# Patient Record
Sex: FEMALE | Race: WHITE | NOT HISPANIC OR LATINO
[De-identification: names, ages, dates, MRNs, and addresses within clinical notes are randomized per-mention and may not be internally consistent; named-entity substitution may affect disease eponyms.]

---

## 2022-10-25 PROBLEM — Z00.00 ENCOUNTER FOR PREVENTIVE HEALTH EXAMINATION: Status: ACTIVE | Noted: 2022-10-25

## 2022-10-26 ENCOUNTER — APPOINTMENT (OUTPATIENT)
Dept: ORTHOPEDIC SURGERY | Facility: CLINIC | Age: 67
End: 2022-10-26

## 2022-10-26 VITALS — BODY MASS INDEX: 29.19 KG/M2 | WEIGHT: 186 LBS | HEIGHT: 67 IN

## 2022-10-26 DIAGNOSIS — M23.91 UNSPECIFIED INTERNAL DERANGEMENT OF RIGHT KNEE: ICD-10-CM

## 2022-10-26 PROCEDURE — 73562 X-RAY EXAM OF KNEE 3: CPT | Mod: 50

## 2022-10-26 PROCEDURE — 99204 OFFICE O/P NEW MOD 45 MIN: CPT

## 2022-10-26 RX ORDER — NAPROXEN 500 MG/1
500 TABLET ORAL
Qty: 60 | Refills: 1 | Status: ACTIVE | COMMUNITY
Start: 2022-10-26 | End: 1900-01-01

## 2022-10-26 NOTE — HISTORY OF PRESENT ILLNESS
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 No formal tx to date.  She has tried biofreeze to temporary benefit. Bracing on the right knee provides some relief. She is able to perform most of her daily activity but with some pain. Difficulty with squatting.  [] : no [FreeTextEntry5] : patient feels pain in  bilateral knees. pt feels popping in bilateral knees. the right knee is worse than the left knee.  [FreeTextEntry9] : Voltaren

## 2022-10-26 NOTE — IMAGING
[de-identified] : RIGHT KNEE\par +Medial joint line tenderness\par  Lateral joint line tenderness \par ROM 0-120\par 5/5 Strength\par NVI\par + [place]rays\par Non-antalgic gait w/o assistance\par  \par LEFT KNEE\par + Mild Medial joint line tenderness\par (-) Lateral joint line tenderness\par ROM 0-125\par 5/5 Strength\par NVI [FreeTextEntry9] : Minimal OA

## 2022-12-09 ENCOUNTER — APPOINTMENT (OUTPATIENT)
Dept: SURGICAL ONCOLOGY | Facility: CLINIC | Age: 67
End: 2022-12-09

## 2022-12-09 VITALS
WEIGHT: 186 LBS | RESPIRATION RATE: 16 BRPM | SYSTOLIC BLOOD PRESSURE: 132 MMHG | DIASTOLIC BLOOD PRESSURE: 75 MMHG | OXYGEN SATURATION: 98 % | HEIGHT: 67 IN | BODY MASS INDEX: 29.19 KG/M2 | HEART RATE: 62 BPM

## 2022-12-09 PROCEDURE — 99205 OFFICE O/P NEW HI 60 MIN: CPT

## 2022-12-10 NOTE — HISTORY OF PRESENT ILLNESS
[de-identified] : Patient is a 67 y/o female who presents an initial consultation for left breast lobular carcinoma (ER/NC+,HER-2 negative). Pt used a fitmob  for her visit.\par \par Biopsy (11/23/22):\par Left breast 2:00, core biopsy:\par -Invasive lobular carcinoma (7 mm in greatest microscopic dimension)\par -ER+/NC+, Her-2 negative\par \par Patient underwent screening mammogram/sonogram on 10/20/22 which showed left breast spiculated mass suspicious for malignancy. No sonographic correlate identified on screening. Also left breast asymmetry. Further unilateral left breast mammogram and ultrasound was performed on 11/9/22 which revealed a left upper outer quadrant, mid third depth 1.1 cm spiculated mass with associated architectural distortion for which a biopsy is recommended. (BI-RADS 5)\par \par No significant past medical history. She is on Atorvastatin for cholesterol. \par Paternal grandmother had breast cancer.

## 2022-12-10 NOTE — PHYSICAL EXAM
[Normal] : supple, no neck mass and thyroid not enlarged [Normal Neck Lymph Nodes] : normal neck lymph nodes  [Normal Supraclavicular Lymph Nodes] : normal supraclavicular lymph nodes [Normal Groin Lymph Nodes] : normal groin lymph nodes [Normal Axillary Lymph Nodes] : normal axillary lymph nodes [Normal] : oriented to person, place and time, with appropriate affect [de-identified] : no masses or adenopathy bilaterally

## 2022-12-10 NOTE — ASSESSMENT
[FreeTextEntry1] : T1 left breast invasive lobular carcinoma, ER/TN+, Her-2 negative\par I had a long discussion with the pt using a [place]  regarding her diagnosis, prognosis and all management options. \par Recommend left breast lumpectomy under Magseed localization and axillary sentinel node biopsy\par Will get pre-op breast MRI to r/o multi-focal or contralateral disease\par Surgical procedure discussed in detail\par All questions answered\par Will need medical clearance \par

## 2022-12-10 NOTE — ADDENDUM
[FreeTextEntry1] : I, Nevin Ricardo, acted solely as a scribe for Dr. Monty Barajas on this date 12/09/2022.\par

## 2022-12-10 NOTE — CONSULT LETTER
[Dear  ___] : Dear  [unfilled], [Consult Letter:] : I had the pleasure of evaluating your patient, [unfilled]. [Please see my note below.] : Please see my note below. [Sincerely,] : Sincerely, [FreeTextEntry3] : Monty Barajas MD FACS\par

## 2022-12-23 ENCOUNTER — RESULT REVIEW (OUTPATIENT)
Age: 67
End: 2022-12-23

## 2022-12-23 ENCOUNTER — OUTPATIENT (OUTPATIENT)
Dept: OUTPATIENT SERVICES | Facility: HOSPITAL | Age: 67
LOS: 1 days | End: 2022-12-23
Payer: MEDICARE

## 2022-12-23 DIAGNOSIS — C80.1 MALIGNANT (PRIMARY) NEOPLASM, UNSPECIFIED: ICD-10-CM

## 2022-12-23 LAB — SURGICAL PATHOLOGY STUDY: SIGNIFICANT CHANGE UP

## 2022-12-23 PROCEDURE — 88321 CONSLTJ&REPRT SLD PREP ELSWR: CPT

## 2022-12-30 ENCOUNTER — OUTPATIENT (OUTPATIENT)
Dept: OUTPATIENT SERVICES | Facility: HOSPITAL | Age: 67
LOS: 1 days | End: 2022-12-30
Payer: MEDICARE

## 2022-12-30 ENCOUNTER — APPOINTMENT (OUTPATIENT)
Dept: MRI IMAGING | Facility: IMAGING CENTER | Age: 67
End: 2022-12-30
Payer: MEDICARE

## 2022-12-30 DIAGNOSIS — C50.912 MALIGNANT NEOPLASM OF UNSPECIFIED SITE OF LEFT FEMALE BREAST: ICD-10-CM

## 2022-12-30 PROCEDURE — 77049 MRI BREAST C-+ W/CAD BI: CPT | Mod: 26

## 2022-12-30 PROCEDURE — C8937: CPT

## 2022-12-30 PROCEDURE — C8908: CPT

## 2022-12-30 PROCEDURE — A9585: CPT

## 2023-01-03 ENCOUNTER — NON-APPOINTMENT (OUTPATIENT)
Age: 68
End: 2023-01-03

## 2023-01-10 ENCOUNTER — OUTPATIENT (OUTPATIENT)
Dept: OUTPATIENT SERVICES | Facility: HOSPITAL | Age: 68
LOS: 1 days | End: 2023-01-10

## 2023-01-10 VITALS
SYSTOLIC BLOOD PRESSURE: 121 MMHG | HEART RATE: 70 BPM | WEIGHT: 195.99 LBS | OXYGEN SATURATION: 98 % | RESPIRATION RATE: 14 BRPM | DIASTOLIC BLOOD PRESSURE: 73 MMHG | HEIGHT: 67 IN | TEMPERATURE: 98 F

## 2023-01-10 DIAGNOSIS — C50.912 MALIGNANT NEOPLASM OF UNSPECIFIED SITE OF LEFT FEMALE BREAST: ICD-10-CM

## 2023-01-10 DIAGNOSIS — Z98.890 OTHER SPECIFIED POSTPROCEDURAL STATES: Chronic | ICD-10-CM

## 2023-01-10 DIAGNOSIS — Z90.710 ACQUIRED ABSENCE OF BOTH CERVIX AND UTERUS: Chronic | ICD-10-CM

## 2023-01-10 LAB
ALBUMIN SERPL ELPH-MCNC: 4.3 G/DL — SIGNIFICANT CHANGE UP (ref 3.3–5)
ALP SERPL-CCNC: 63 U/L — SIGNIFICANT CHANGE UP (ref 40–120)
ALT FLD-CCNC: 21 U/L — SIGNIFICANT CHANGE UP (ref 4–33)
ANION GAP SERPL CALC-SCNC: 9 MMOL/L — SIGNIFICANT CHANGE UP (ref 7–14)
AST SERPL-CCNC: 17 U/L — SIGNIFICANT CHANGE UP (ref 4–32)
BILIRUB SERPL-MCNC: 0.2 MG/DL — SIGNIFICANT CHANGE UP (ref 0.2–1.2)
BUN SERPL-MCNC: 20 MG/DL — SIGNIFICANT CHANGE UP (ref 7–23)
CALCIUM SERPL-MCNC: 9.3 MG/DL — SIGNIFICANT CHANGE UP (ref 8.4–10.5)
CHLORIDE SERPL-SCNC: 103 MMOL/L — SIGNIFICANT CHANGE UP (ref 98–107)
CO2 SERPL-SCNC: 27 MMOL/L — SIGNIFICANT CHANGE UP (ref 22–31)
CREAT SERPL-MCNC: 0.89 MG/DL — SIGNIFICANT CHANGE UP (ref 0.5–1.3)
EGFR: 72 ML/MIN/1.73M2 — SIGNIFICANT CHANGE UP
GLUCOSE SERPL-MCNC: 78 MG/DL — SIGNIFICANT CHANGE UP (ref 70–99)
HCT VFR BLD CALC: 36.6 % — SIGNIFICANT CHANGE UP (ref 34.5–45)
HGB BLD-MCNC: 11.8 G/DL — SIGNIFICANT CHANGE UP (ref 11.5–15.5)
MCHC RBC-ENTMCNC: 27.3 PG — SIGNIFICANT CHANGE UP (ref 27–34)
MCHC RBC-ENTMCNC: 32.2 GM/DL — SIGNIFICANT CHANGE UP (ref 32–36)
MCV RBC AUTO: 84.7 FL — SIGNIFICANT CHANGE UP (ref 80–100)
NRBC # BLD: 0 /100 WBCS — SIGNIFICANT CHANGE UP (ref 0–0)
NRBC # FLD: 0 K/UL — SIGNIFICANT CHANGE UP (ref 0–0)
PLATELET # BLD AUTO: 221 K/UL — SIGNIFICANT CHANGE UP (ref 150–400)
POTASSIUM SERPL-MCNC: 3.9 MMOL/L — SIGNIFICANT CHANGE UP (ref 3.5–5.3)
POTASSIUM SERPL-SCNC: 3.9 MMOL/L — SIGNIFICANT CHANGE UP (ref 3.5–5.3)
PROT SERPL-MCNC: 7.4 G/DL — SIGNIFICANT CHANGE UP (ref 6–8.3)
RBC # BLD: 4.32 M/UL — SIGNIFICANT CHANGE UP (ref 3.8–5.2)
RBC # FLD: 13.7 % — SIGNIFICANT CHANGE UP (ref 10.3–14.5)
SODIUM SERPL-SCNC: 139 MMOL/L — SIGNIFICANT CHANGE UP (ref 135–145)
WBC # BLD: 6.55 K/UL — SIGNIFICANT CHANGE UP (ref 3.8–10.5)
WBC # FLD AUTO: 6.55 K/UL — SIGNIFICANT CHANGE UP (ref 3.8–10.5)

## 2023-01-10 NOTE — H&P PST ADULT - EKG AND INTERPRETATION
Addended by: Bipin Munoz on: 4/26/2022 12:14 PM     Modules accepted: Level of Service ekg in chart from pmd

## 2023-01-10 NOTE — H&P PST ADULT - PROBLEM SELECTOR PLAN 1
Pt scheduled for left breast lumpectomy, magseed localization X1 site, left axillary sentinel node biopsy on 1/23/2023.  labs done results pending, ekg in chart.  pt instructed to obtain preop covid testing.  Hibiclens provided-  written and verbal instructions given with teach back, pt able to verbalize understanding.  Preop teaching done, pt able to verbalize understanding.   medication day of procedure-  pepcid   left upper loose dental implant which holds the bridge-  pt unable to afford dental eval at this time,  pt was evaluated by anesthesia Dr. Aburto while in pst, no dental eval necessary at this time

## 2023-01-10 NOTE — H&P PST ADULT - HISTORY OF PRESENT ILLNESS
68y/o female scheduled fo 66y/o female scheduled for left breast lumpectomy, magseed localization X1 site, left axillary sentinel node biopsy on 1/23/2023.  Pt states, "had abnormal mammogram and US, underwent left breast bx positive for malignancy."

## 2023-01-10 NOTE — H&P PST ADULT - NSICDXPASTMEDICALHX_GEN_ALL_CORE_FT
PAST MEDICAL HISTORY:  Malignant neoplasm of breast      PAST MEDICAL HISTORY:  Eczema     Malignant neoplasm of breast

## 2023-01-10 NOTE — H&P PST ADULT - GASTROINTESTINAL
negative normal/soft/nontender/nondistended/normal active bowel sounds/no guarding/no rigidity/no organomegaly/no palpable brittney/no masses palpable

## 2023-01-10 NOTE — H&P PST ADULT - NEGATIVE CARDIOVASCULAR SYMPTOMS
Recommended weight and BMI control through healthy diet and exercise, green leafy veggies, UV protection, and not smoking. Reviewed the benefits of AREDS VITAMINS VERUS GENOTYPE directed vitamin therapy, and recommended following one or the other to try and prevent the progression of the disease. I advised if patient smokes or has ever smoked to avoid high doses of vitamin A (beta carotene) due to increased risk of lung cancer. Reviewed the importance of daily monitoring of the vision in each eye independently, along with the use of the Amsler grid daily and instructed patient to call and return immediately for any new changes in their vision or on the Amsler grid. Patient instructed on the importance of regular follow up and monitoring for the early detection of conversion to wet AMD as early detection results in early treatment and better outcomes. no chest pain/no palpitations/no dyspnea on exertion/no orthopnea/no paroxysmal nocturnal dyspnea/no peripheral edema/no claudication

## 2023-01-12 ENCOUNTER — OUTPATIENT (OUTPATIENT)
Dept: OUTPATIENT SERVICES | Facility: HOSPITAL | Age: 68
LOS: 1 days | End: 2023-01-12
Payer: MEDICARE

## 2023-01-12 ENCOUNTER — APPOINTMENT (OUTPATIENT)
Dept: MRI IMAGING | Facility: IMAGING CENTER | Age: 68
End: 2023-01-12
Payer: MEDICARE

## 2023-01-12 ENCOUNTER — RESULT REVIEW (OUTPATIENT)
Age: 68
End: 2023-01-12

## 2023-01-12 DIAGNOSIS — Z90.710 ACQUIRED ABSENCE OF BOTH CERVIX AND UTERUS: Chronic | ICD-10-CM

## 2023-01-12 DIAGNOSIS — Z98.890 OTHER SPECIFIED POSTPROCEDURAL STATES: Chronic | ICD-10-CM

## 2023-01-12 DIAGNOSIS — Z00.8 ENCOUNTER FOR OTHER GENERAL EXAMINATION: ICD-10-CM

## 2023-01-12 PROBLEM — L30.9 DERMATITIS, UNSPECIFIED: Chronic | Status: ACTIVE | Noted: 2023-01-10

## 2023-01-12 PROBLEM — C50.919 MALIGNANT NEOPLASM OF UNSPECIFIED SITE OF UNSPECIFIED FEMALE BREAST: Chronic | Status: ACTIVE | Noted: 2023-01-10

## 2023-01-12 PROCEDURE — 77065 DX MAMMO INCL CAD UNI: CPT

## 2023-01-12 PROCEDURE — 19085 BX BREAST 1ST LESION MR IMAG: CPT

## 2023-01-12 PROCEDURE — 77065 DX MAMMO INCL CAD UNI: CPT | Mod: 26,LT

## 2023-01-12 PROCEDURE — 88305 TISSUE EXAM BY PATHOLOGIST: CPT

## 2023-01-12 PROCEDURE — 88305 TISSUE EXAM BY PATHOLOGIST: CPT | Mod: 26

## 2023-01-12 PROCEDURE — 19085 BX BREAST 1ST LESION MR IMAG: CPT | Mod: LT

## 2023-01-12 PROCEDURE — 88360 TUMOR IMMUNOHISTOCHEM/MANUAL: CPT

## 2023-01-12 PROCEDURE — A4648: CPT

## 2023-01-12 PROCEDURE — A9585: CPT

## 2023-01-12 PROCEDURE — 88360 TUMOR IMMUNOHISTOCHEM/MANUAL: CPT | Mod: 26

## 2023-01-18 ENCOUNTER — NON-APPOINTMENT (OUTPATIENT)
Age: 68
End: 2023-01-18

## 2023-01-23 ENCOUNTER — APPOINTMENT (OUTPATIENT)
Dept: SURGICAL ONCOLOGY | Facility: AMBULATORY SURGERY CENTER | Age: 68
End: 2023-01-23

## 2023-01-23 ENCOUNTER — APPOINTMENT (OUTPATIENT)
Dept: NUCLEAR MEDICINE | Facility: IMAGING CENTER | Age: 68
End: 2023-01-23

## 2023-01-27 ENCOUNTER — NON-APPOINTMENT (OUTPATIENT)
Age: 68
End: 2023-01-27

## 2023-02-02 ENCOUNTER — RESULT REVIEW (OUTPATIENT)
Age: 68
End: 2023-02-02

## 2023-02-02 ENCOUNTER — APPOINTMENT (OUTPATIENT)
Dept: MRI IMAGING | Facility: IMAGING CENTER | Age: 68
End: 2023-02-02
Payer: MEDICARE

## 2023-02-02 ENCOUNTER — OUTPATIENT (OUTPATIENT)
Dept: OUTPATIENT SERVICES | Facility: HOSPITAL | Age: 68
LOS: 1 days | End: 2023-02-02
Payer: MEDICARE

## 2023-02-02 DIAGNOSIS — C50.912 MALIGNANT NEOPLASM OF UNSPECIFIED SITE OF LEFT FEMALE BREAST: ICD-10-CM

## 2023-02-02 DIAGNOSIS — Z98.890 OTHER SPECIFIED POSTPROCEDURAL STATES: Chronic | ICD-10-CM

## 2023-02-02 DIAGNOSIS — Z90.710 ACQUIRED ABSENCE OF BOTH CERVIX AND UTERUS: Chronic | ICD-10-CM

## 2023-02-02 PROCEDURE — 88305 TISSUE EXAM BY PATHOLOGIST: CPT | Mod: 26

## 2023-02-02 PROCEDURE — 88305 TISSUE EXAM BY PATHOLOGIST: CPT

## 2023-02-02 PROCEDURE — A9585: CPT

## 2023-02-02 PROCEDURE — 77065 DX MAMMO INCL CAD UNI: CPT | Mod: 26,LT

## 2023-02-02 PROCEDURE — 19085 BX BREAST 1ST LESION MR IMAG: CPT | Mod: LT

## 2023-02-02 PROCEDURE — A4648: CPT

## 2023-02-02 PROCEDURE — 19085 BX BREAST 1ST LESION MR IMAG: CPT

## 2023-02-02 PROCEDURE — 77065 DX MAMMO INCL CAD UNI: CPT

## 2023-02-09 LAB — SURGICAL PATHOLOGY STUDY: SIGNIFICANT CHANGE UP

## 2023-02-13 ENCOUNTER — APPOINTMENT (OUTPATIENT)
Dept: SURGICAL ONCOLOGY | Facility: CLINIC | Age: 68
End: 2023-02-13
Payer: MEDICARE

## 2023-02-13 VITALS
HEIGHT: 67 IN | OXYGEN SATURATION: 97 % | TEMPERATURE: 97.9 F | RESPIRATION RATE: 17 BRPM | HEART RATE: 78 BPM | WEIGHT: 194 LBS | SYSTOLIC BLOOD PRESSURE: 114 MMHG | BODY MASS INDEX: 30.45 KG/M2 | DIASTOLIC BLOOD PRESSURE: 68 MMHG

## 2023-02-13 PROCEDURE — 99215 OFFICE O/P EST HI 40 MIN: CPT

## 2023-02-13 NOTE — HISTORY OF PRESENT ILLNESS
[de-identified] : Patient is a 67 y/o female who presents for pre-op planning visit for left breast cancers and left breast ADH. \par \par MRI guided core biopsy 1/12/23:\par Left inferior central breast: DCIS, solid and cribriform pattern with low grade nuclear atypia. ER/KS+ \par Left upper slightly outer breast: breast tissue with focal ADH \par \par Core Biopsy (11/23/22):\par Left breast 2:00, core biopsy:\par -Invasive lobular carcinoma (7 mm in greatest microscopic dimension)\par -ER+/KS+, Her-2 negative\par \par Patient underwent screening mammogram/sonogram on 10/20/22 which showed left breast spiculated mass suspicious for malignancy. No sonographic correlate identified on screening. Also left breast asymmetry. Further unilateral left breast mammogram and ultrasound was performed on 11/9/22 which revealed a left upper outer quadrant, mid third depth 1.1 cm spiculated mass with associated architectural distortion for which a biopsy is recommended. (BI-RADS 5)\par \par Breast MRI 12/30/2022: Known malignancy left breast. As above, wide excision is recommended. If breast conservation is elected, recommend consideration of biopsy of additional site at inferior central breast with expectant management of focus upper outer quadrant (6 month MRI follow up if benign results obtained). No MRI evidence of malignancy right breast. BIRADS-4A\par \par No significant past medical history. She is on Atorvastatin for cholesterol. \par Paternal grandmother had breast cancer.

## 2023-02-13 NOTE — CONSULT LETTER
[Dear  ___] : Dear  [unfilled], [Please see my note below.] : Please see my note below. [Sincerely,] : Sincerely, [Courtesy Letter:] : I had the pleasure of seeing your patient, [unfilled], in my office today. [FreeTextEntry3] : Monty Barajas MD FACS\par

## 2023-02-13 NOTE — ASSESSMENT
[FreeTextEntry1] : T1 left breast 2:00 invasive lobular carcinoma, ER/MD+, Her-2 negative\par Left inferior central breast DCIS, ER/MD+\par Left upper outer breast ADH\par No evidence of disease seen in right breast on MRI\par I had a long discussion with the pt regarding her diagnosis, prognosis and all management options including mastectomy vs. 3 separate lumpectomies\par I explained again that given the 3 areas of disease, the standard of care would be mastectomy with reconstruction however the patient strongly prefers an attempt at breast conservation\par Imaging reviewed with Dr. Barbara Henao of breast radiology who agrees that 3 site lumpectomy is possible given the focal nature of disease\par Will proceed with 3 site left breast lumpectomy under Magseed localization and left axillary sentinel node biopsy\par Surgical procedure discussed in detail\par All questions answered\par Will need medical clearance \par

## 2023-02-13 NOTE — PHYSICAL EXAM
[Normal] : supple, no neck mass and thyroid not enlarged [Normal Neck Lymph Nodes] : normal neck lymph nodes  [Normal Supraclavicular Lymph Nodes] : normal supraclavicular lymph nodes [Normal Groin Lymph Nodes] : normal groin lymph nodes [Normal Axillary Lymph Nodes] : normal axillary lymph nodes [Normal] : oriented to person, place and time, with appropriate affect [de-identified] : no masses or adenopathy bilaterally

## 2023-02-15 ENCOUNTER — OUTPATIENT (OUTPATIENT)
Dept: OUTPATIENT SERVICES | Facility: HOSPITAL | Age: 68
LOS: 1 days | End: 2023-02-15
Payer: MEDICARE

## 2023-02-15 VITALS
HEIGHT: 66 IN | WEIGHT: 194.01 LBS | RESPIRATION RATE: 18 BRPM | DIASTOLIC BLOOD PRESSURE: 74 MMHG | OXYGEN SATURATION: 98 % | HEART RATE: 66 BPM | TEMPERATURE: 98 F | SYSTOLIC BLOOD PRESSURE: 124 MMHG

## 2023-02-15 DIAGNOSIS — C50.912 MALIGNANT NEOPLASM OF UNSPECIFIED SITE OF LEFT FEMALE BREAST: ICD-10-CM

## 2023-02-15 DIAGNOSIS — Z90.89 ACQUIRED ABSENCE OF OTHER ORGANS: Chronic | ICD-10-CM

## 2023-02-15 DIAGNOSIS — Z90.710 ACQUIRED ABSENCE OF BOTH CERVIX AND UTERUS: Chronic | ICD-10-CM

## 2023-02-15 DIAGNOSIS — Z98.890 OTHER SPECIFIED POSTPROCEDURAL STATES: Chronic | ICD-10-CM

## 2023-02-15 PROCEDURE — 93010 ELECTROCARDIOGRAM REPORT: CPT

## 2023-02-15 RX ORDER — SOD,AMMONIUM,POTASSIUM LACTATE
1 CREAM (GRAM) TOPICAL
Qty: 0 | Refills: 0 | DISCHARGE

## 2023-02-15 NOTE — H&P PST ADULT - RESPIRATORY
clear to auscultation bilaterally/no wheezes/no rales/no rhonchi/airway patent/breath sounds equal/good air movement clear to auscultation bilaterally/no wheezes/no rales/no rhonchi/no respiratory distress/no use of accessory muscles/airway patent/breath sounds equal/good air movement/respirations non-labored

## 2023-02-15 NOTE — H&P PST ADULT - BREASTS DETAILS
normal shape/no tenderness/nipples normal/mass L normal shape/no mass/no tenderness/nipples normal/no discharge or discoloration/normal

## 2023-02-15 NOTE — H&P PST ADULT - PROBLEM SELECTOR PLAN 1
Schedule for left breast lumpectomy Mag Seed localization x3 sites, left axillary sentinel node biopsy on 02/27/23. Pre op instructions, famotidine, chlorhexidine gluconate soap given and explained. Pt verbalized understanding.  [id number] PCR ordered

## 2023-02-15 NOTE — H&P PST ADULT - HISTORY OF PRESENT ILLNESS
68 y/o female     scheduled for left breast lumpectomy, magseed localization X1 site, left axillary sentinel node biopsy on 1/23/2023.  Pt states, "had abnormal mammogram and US, underwent left breast bx positive for malignancy." 66 y/o female presents with pre op diagnosis: malignant neoplasm unspecified site left female breast. Schedule for left breast lumpectomy, Mag seed localization x3 site, left axillary sentinel node biopsy tentatively on 02/27/23.   Pt was initially schedule for same surgery on 01/23/2023. As per pt, surgery was cancelled due to need for further testing.

## 2023-02-15 NOTE — H&P PST ADULT - ASSESSMENT
68 y/o Female presents with pre op diagnosis: malignant neoplasm of unspecified site of left female breast

## 2023-02-23 ENCOUNTER — RESULT REVIEW (OUTPATIENT)
Age: 68
End: 2023-02-23

## 2023-02-23 ENCOUNTER — OUTPATIENT (OUTPATIENT)
Dept: OUTPATIENT SERVICES | Facility: HOSPITAL | Age: 68
LOS: 1 days | End: 2023-02-23
Payer: MEDICARE

## 2023-02-23 ENCOUNTER — APPOINTMENT (OUTPATIENT)
Dept: MAMMOGRAPHY | Facility: IMAGING CENTER | Age: 68
End: 2023-02-23
Payer: MEDICARE

## 2023-02-23 DIAGNOSIS — Z98.890 OTHER SPECIFIED POSTPROCEDURAL STATES: Chronic | ICD-10-CM

## 2023-02-23 DIAGNOSIS — Z90.89 ACQUIRED ABSENCE OF OTHER ORGANS: Chronic | ICD-10-CM

## 2023-02-23 DIAGNOSIS — Z00.8 ENCOUNTER FOR OTHER GENERAL EXAMINATION: ICD-10-CM

## 2023-02-23 DIAGNOSIS — Z90.710 ACQUIRED ABSENCE OF BOTH CERVIX AND UTERUS: Chronic | ICD-10-CM

## 2023-02-23 PROCEDURE — 19282 PERQ DEVICE BREAST EA IMAG: CPT

## 2023-02-23 PROCEDURE — 19281 PERQ DEVICE BREAST 1ST IMAG: CPT | Mod: LT

## 2023-02-23 PROCEDURE — A4648: CPT

## 2023-02-23 PROCEDURE — 19282 PERQ DEVICE BREAST EA IMAG: CPT | Mod: 59,LT

## 2023-02-23 PROCEDURE — 19282 PERQ DEVICE BREAST EA IMAG: CPT | Mod: LT

## 2023-02-23 PROCEDURE — 19281 PERQ DEVICE BREAST 1ST IMAG: CPT

## 2023-02-23 NOTE — H&P PST ADULT - NEGATIVE OPHTHALMOLOGIC SYMPTOMS
Kandice Iv well  Awake alert     Jermaine Ritchie RN  02/23/23 1921 no blurred vision R/no discharge L/no pain L/no pain R/no loss of vision L

## 2023-02-27 ENCOUNTER — APPOINTMENT (OUTPATIENT)
Dept: NUCLEAR MEDICINE | Facility: IMAGING CENTER | Age: 68
End: 2023-02-27

## 2023-02-27 ENCOUNTER — APPOINTMENT (OUTPATIENT)
Dept: MAMMOGRAPHY | Facility: IMAGING CENTER | Age: 68
End: 2023-02-27

## 2023-03-13 ENCOUNTER — RESULT REVIEW (OUTPATIENT)
Age: 68
End: 2023-03-13

## 2023-03-13 ENCOUNTER — APPOINTMENT (OUTPATIENT)
Dept: MAMMOGRAPHY | Facility: IMAGING CENTER | Age: 68
End: 2023-03-13
Payer: MEDICARE

## 2023-03-13 ENCOUNTER — TRANSCRIPTION ENCOUNTER (OUTPATIENT)
Age: 68
End: 2023-03-13

## 2023-03-13 ENCOUNTER — OUTPATIENT (OUTPATIENT)
Dept: OUTPATIENT SERVICES | Facility: HOSPITAL | Age: 68
LOS: 1 days | End: 2023-03-13
Payer: COMMERCIAL

## 2023-03-13 ENCOUNTER — APPOINTMENT (OUTPATIENT)
Dept: SURGICAL ONCOLOGY | Facility: AMBULATORY SURGERY CENTER | Age: 68
End: 2023-03-13

## 2023-03-13 ENCOUNTER — OUTPATIENT (OUTPATIENT)
Dept: OUTPATIENT SERVICES | Facility: HOSPITAL | Age: 68
LOS: 1 days | Discharge: HOME | End: 2023-03-13
Payer: MEDICARE

## 2023-03-13 VITALS
OXYGEN SATURATION: 99 % | HEART RATE: 72 BPM | SYSTOLIC BLOOD PRESSURE: 116 MMHG | TEMPERATURE: 97 F | RESPIRATION RATE: 14 BRPM | DIASTOLIC BLOOD PRESSURE: 68 MMHG

## 2023-03-13 VITALS
WEIGHT: 194.01 LBS | OXYGEN SATURATION: 97 % | TEMPERATURE: 98 F | RESPIRATION RATE: 18 BRPM | HEART RATE: 62 BPM | DIASTOLIC BLOOD PRESSURE: 75 MMHG | SYSTOLIC BLOOD PRESSURE: 128 MMHG | HEIGHT: 66 IN

## 2023-03-13 DIAGNOSIS — Z90.89 ACQUIRED ABSENCE OF OTHER ORGANS: Chronic | ICD-10-CM

## 2023-03-13 DIAGNOSIS — Z90.710 ACQUIRED ABSENCE OF BOTH CERVIX AND UTERUS: Chronic | ICD-10-CM

## 2023-03-13 DIAGNOSIS — Z98.890 OTHER SPECIFIED POSTPROCEDURAL STATES: Chronic | ICD-10-CM

## 2023-03-13 DIAGNOSIS — C50.912 MALIGNANT NEOPLASM OF UNSPECIFIED SITE OF LEFT FEMALE BREAST: ICD-10-CM

## 2023-03-13 DIAGNOSIS — Z00.8 ENCOUNTER FOR OTHER GENERAL EXAMINATION: ICD-10-CM

## 2023-03-13 PROCEDURE — 76098 X-RAY EXAM SURGICAL SPECIMEN: CPT

## 2023-03-13 PROCEDURE — 88342 IMHCHEM/IMCYTCHM 1ST ANTB: CPT | Mod: 26,59

## 2023-03-13 PROCEDURE — 38900 IO MAP OF SENT LYMPH NODE: CPT | Mod: LT

## 2023-03-13 PROCEDURE — 88305 TISSUE EXAM BY PATHOLOGIST: CPT | Mod: 26

## 2023-03-13 PROCEDURE — 88341 IMHCHEM/IMCYTCHM EA ADD ANTB: CPT | Mod: 26,59

## 2023-03-13 PROCEDURE — 14000 TIS TRNFR TRUNK 10 SQ CM/<: CPT

## 2023-03-13 PROCEDURE — 88307 TISSUE EXAM BY PATHOLOGIST: CPT | Mod: 26

## 2023-03-13 PROCEDURE — 88360 TUMOR IMMUNOHISTOCHEM/MANUAL: CPT | Mod: 26

## 2023-03-13 PROCEDURE — 76098 X-RAY EXAM SURGICAL SPECIMEN: CPT | Mod: 26

## 2023-03-13 PROCEDURE — 38525 BIOPSY/REMOVAL LYMPH NODES: CPT | Mod: LT

## 2023-03-13 PROCEDURE — 19301 PARTIAL MASTECTOMY: CPT | Mod: LT

## 2023-03-13 PROCEDURE — 38792 RA TRACER ID OF SENTINL NODE: CPT | Mod: LT,59

## 2023-03-13 DEVICE — ARISTA 3GR: Type: IMPLANTABLE DEVICE | Site: LEFT | Status: FUNCTIONAL

## 2023-03-13 DEVICE — SURGICEL FIBRILLAR 2 X 4": Type: IMPLANTABLE DEVICE | Site: LEFT | Status: FUNCTIONAL

## 2023-03-13 RX ORDER — HALOBETASOL PROPIONATE 0.5 MG/G
1 OINTMENT TOPICAL
Qty: 0 | Refills: 0 | DISCHARGE

## 2023-03-13 RX ORDER — PANTOPRAZOLE SODIUM 20 MG/1
1 TABLET, DELAYED RELEASE ORAL
Qty: 0 | Refills: 0 | DISCHARGE

## 2023-03-13 RX ORDER — HYDROXYZINE HCL 10 MG
1 TABLET ORAL
Qty: 0 | Refills: 0 | DISCHARGE

## 2023-03-13 RX ORDER — OXYCODONE HYDROCHLORIDE 5 MG/1
1 TABLET ORAL
Qty: 20 | Refills: 0
Start: 2023-03-13

## 2023-03-13 NOTE — ASU DISCHARGE PLAN (ADULT/PEDIATRIC) - CARE PROVIDER_API CALL
Monty Barajas)  Surgery  [address] [place] [address]  Phone: [phone]  Fax: [phone]  Follow Up Time: 2 weeks

## 2023-03-13 NOTE — ASU PREOPERATIVE ASSESSMENT, ADULT ([facility] ONLY) - FALL HARM RISK - UNIVERSAL INTERVENTIONS
Bed in lowest position, wheels locked, appropriate side rails in place/Call bell, personal items and telephone in reach/Instruct patient to call for assistance before getting out of bed or chair/Non-slip footwear when patient is out of bed/[place] to call system/Physically safe environment - no spills, clutter or unnecessary equipment/Purposeful Proactive Rounding/Room/bathroom lighting operational, light cord in reach

## 2023-03-13 NOTE — ASU PREOP CHECKLIST - BMI (KG/M2)
Benzoyl Peroxide Pregnancy And Lactation Text: This medication is Pregnancy Category C. It is unknown if benzoyl peroxide is excreted in breast milk. Topical Retinoid Pregnancy And Lactation Text: This medication is Pregnancy Category C. It is unknown if this medication is excreted in breast milk. Tazorac Pregnancy And Lactation Text: This medication is not safe during pregnancy. It is unknown if this medication is excreted in breast milk. Birth Control Pills Counseling: Birth Control Pill Counseling: I discussed with the patient the potential side effects of OCPs including but not limited to increased risk of stroke, heart attack, thrombophlebitis, deep venous thrombosis, hepatic adenomas, breast changes, GI upset, headaches, and depression.  The patient verbalized understanding of the proper use and possible adverse effects of OCPs. All of the patient's questions and concerns were addressed. Dapsone Counseling: I discussed with the patient the risks of dapsone including but not limited to hemolytic anemia, agranulocytosis, rashes, methemoglobinemia, kidney failure, peripheral neuropathy, headaches, GI upset, and liver toxicity.  Patients who start dapsone require monitoring including baseline LFTs and weekly CBCs for the first month, then every month thereafter.  The patient verbalized understanding of the proper use and possible adverse effects of dapsone.  All of the patient's questions and concerns were addressed. Bactrim Counseling:  I discussed with the patient the risks of sulfa antibiotics including but not limited to GI upset, allergic reaction, drug rash, diarrhea, dizziness, photosensitivity, and yeast infections.  Rarely, more serious reactions can occur including but not limited to aplastic anemia, agranulocytosis, methemoglobinemia, blood dyscrasias, liver or kidney failure, lung infiltrates or desquamative/blistering drug rashes. Topical Sulfur Applications Pregnancy And Lactation Text: This medication is Pregnancy Category C and has an unknown safety profile during pregnancy. It is unknown if this topical medication is excreted in breast milk. Winlevi Pregnancy And Lactation Text: This medication is considered safe during pregnancy and breastfeeding. Doxycycline Counseling:  Patient counseled regarding possible photosensitivity and increased risk for sunburn.  Patient instructed to avoid sunlight, if possible.  When exposed to sunlight, patients should wear protective clothing, sunglasses, and sunscreen.  The patient was instructed to call the office immediately if the following severe adverse effects occur:  hearing changes, easy bruising/bleeding, severe headache, or vision changes.  The patient verbalized understanding of the proper use and possible adverse effects of doxycycline.  All of the patient's questions and concerns were addressed. Erythromycin Counseling:  I discussed with the patient the risks of erythromycin including but not limited to GI upset, allergic reaction, drug rash, diarrhea, increase in liver enzymes, and yeast infections. Topical Clindamycin Pregnancy And Lactation Text: This medication is Pregnancy Category B and is considered safe during pregnancy. It is unknown if it is excreted in breast milk. Detail Level: Zone Erythromycin Pregnancy And Lactation Text: This medication is Pregnancy Category B and is considered safe during pregnancy. It is also excreted in breast milk. Isotretinoin Pregnancy And Lactation Text: This medication is Pregnancy Category X and is considered extremely dangerous during pregnancy. It is unknown if it is excreted in breast milk. High Dose Vitamin A Pregnancy And Lactation Text: High dose vitamin A therapy is contraindicated during pregnancy and breast feeding. Tetracycline Pregnancy And Lactation Text: This medication is Pregnancy Category D and not consider safe during pregnancy. It is also excreted in breast milk. Aklief Pregnancy And Lactation Text: It is unknown if this medication is safe to use during pregnancy.  It is unknown if this medication is excreted in breast milk.  Breastfeeding women should use the topical cream on the smallest area of the skin for the shortest time needed while breastfeeding.  Do not apply to nipple and areola. 31.3 Azelaic Acid Pregnancy And Lactation Text: This medication is considered safe during pregnancy and breast feeding. Spironolactone Pregnancy And Lactation Text: This medication can cause feminization of the male fetus and should be avoided during pregnancy. The active metabolite is also found in breast milk. Benzoyl Peroxide Counseling: Patient counseled that medicine may cause skin irritation and bleach clothing.  In the event of skin irritation, the patient was advised to reduce the amount of the drug applied or use it less frequently.   The patient verbalized understanding of the proper use and possible adverse effects of benzoyl peroxide.  All of the patient's questions and concerns were addressed. Topical Retinoid counseling:  Patient advised to apply a pea-sized amount only at bedtime and wait 30 minutes after washing their face before applying.  If too drying, patient may add a non-comedogenic moisturizer. The patient verbalized understanding of the proper use and possible adverse effects of retinoids.  All of the patient's questions and concerns were addressed. Tazorac Counseling:  Patient advised that medication is irritating and drying.  Patient may need to apply sparingly and wash off after an hour before eventually leaving it on overnight.  The patient verbalized understanding of the proper use and possible adverse effects of tazorac.  All of the patient's questions and concerns were addressed. Birth Control Pills Pregnancy And Lactation Text: This medication should be avoided if pregnant and for the first 30 days post-partum. Dapsone Pregnancy And Lactation Text: This medication is Pregnancy Category C and is not considered safe during pregnancy or breast feeding. Include Pregnancy/Lactation Warning?: No Azithromycin Pregnancy And Lactation Text: This medication is considered safe during pregnancy and is also secreted in breast milk. Bactrim Pregnancy And Lactation Text: This medication is Pregnancy Category D and is known to cause fetal risk.  It is also excreted in breast milk. Winlevi Counseling:  I discussed with the patient the risks of topical clascoterone including but not limited to erythema, scaling, itching, and stinging. Patient voiced their understanding. Doxycycline Pregnancy And Lactation Text: This medication is Pregnancy Category D and not consider safe during pregnancy. It is also excreted in breast milk but is considered safe for shorter treatment courses. Azithromycin Counseling:  I discussed with the patient the risks of azithromycin including but not limited to GI upset, allergic reaction, drug rash, diarrhea, and yeast infections. Topical Clindamycin Counseling: Patient counseled that this medication may cause skin irritation or allergic reactions.  In the event of skin irritation, the patient was advised to reduce the amount of the drug applied or use it less frequently.   The patient verbalized understanding of the proper use and possible adverse effects of clindamycin.  All of the patient's questions and concerns were addressed. Topical Sulfur Applications Counseling: Topical Sulfur Counseling: Patient counseled that this medication may cause skin irritation or allergic reactions.  In the event of skin irritation, the patient was advised to reduce the amount of the drug applied or use it less frequently.   The patient verbalized understanding of the proper use and possible adverse effects of topical sulfur application.  All of the patient's questions and concerns were addressed. Isotretinoin Counseling: Patient should get monthly blood tests, not donate blood, not drive at night if vision affected, not share medication, and not undergo elective surgery for 6 months after tx completed. Side effects reviewed, pt to contact office should one occur. High Dose Vitamin A Counseling: Side effects reviewed, pt to contact office should one occur. Aklief counseling:  Patient advised to apply a pea-sized amount only at bedtime and wait 30 minutes after washing their face before applying.  If too drying, patient may add a non-comedogenic moisturizer.  The most commonly reported side effects including irritation, redness, scaling, dryness, stinging, burning, itching, and increased risk of sunburn.  The patient verbalized understanding of the proper use and possible adverse effects of retinoids.  All of the patient's questions and concerns were addressed. Azelaic Acid Counseling: Patient counseled that medicine may cause skin irritation and to avoid applying near the eyes.  In the event of skin irritation, the patient was advised to reduce the amount of the drug applied or use it less frequently.   The patient verbalized understanding of the proper use and possible adverse effects of azelaic acid.  All of the patient's questions and concerns were addressed. Sarecycline Counseling: Patient advised regarding possible photosensitivity and discoloration of the teeth, skin, lips, tongue and gums.  Patient instructed to avoid sunlight, if possible.  When exposed to sunlight, patients should wear protective clothing, sunglasses, and sunscreen.  The patient was instructed to call the office immediately if the following severe adverse effects occur:  hearing changes, easy bruising/bleeding, severe headache, or vision changes.  The patient verbalized understanding of the proper use and possible adverse effects of sarecycline.  All of the patient's questions and concerns were addressed. Spironolactone Counseling: Patient advised regarding risks of diarrhea, abdominal pain, hyperkalemia, birth defects (for female patients), liver toxicity and renal toxicity. The patient may need blood work to monitor liver and kidney function and potassium levels while on therapy. The patient verbalized understanding of the proper use and possible adverse effects of spironolactone.  All of the patient's questions and concerns were addressed. Tetracycline Counseling: Patient counseled regarding possible photosensitivity and increased risk for sunburn.  Patient instructed to avoid sunlight, if possible.  When exposed to sunlight, patients should wear protective clothing, sunglasses, and sunscreen.  The patient was instructed to call the office immediately if the following severe adverse effects occur:  hearing changes, easy bruising/bleeding, severe headache, or vision changes.  The patient verbalized understanding of the proper use and possible adverse effects of tetracycline.  All of the patient's questions and concerns were addressed. Patient understands to avoid pregnancy while on therapy due to potential birth defects. Minocycline Counseling: Patient advised regarding possible photosensitivity and discoloration of the teeth, skin, lips, tongue and gums.  Patient instructed to avoid sunlight, if possible.  When exposed to sunlight, patients should wear protective clothing, sunglasses, and sunscreen.  The patient was instructed to call the office immediately if the following severe adverse effects occur:  hearing changes, easy bruising/bleeding, severe headache, or vision changes.  The patient verbalized understanding of the proper use and possible adverse effects of minocycline.  All of the patient's questions and concerns were addressed. Detail Level: Detailed

## 2023-03-13 NOTE — ASU DISCHARGE PLAN (ADULT/PEDIATRIC) - ASU DC SPECIAL INSTRUCTIONSFT
WOUND CARE: Remove outer plastic dressing if you have one 48hrs from surgery. Covering your incisions are white pieces of tape called steri-strips. You can shower with these and they will curl up and begin to fall off on their own in about 7 days. You might notice a small amount of blood staining or drainage from your abdominal incisions which is normal as well.    BATHING: You may shower daily. Let warm soapy water run over incisions in shower. Do not scrub incisions directly. Pat dry. Do not take tub baths or submerge your incisions in water for 4 weeks to ensure proper healing.    ACTIVITY: No heavy lifting anything more than 10-15lbs (about the weight of a gallon of milk) or straining. Avoid strenuous activity until cleared by your surgeon. If you are taking narcotic pain medication (such as oxycodone), do NOT drive a car, operate machinery or make important decisions.    DIET: Regular    NOTIFY YOUR SURGEON IF: You have any bleeding that does not stop, any pus draining from your wound, any fever (over 100.4 F) or chills, persistent nausea/vomiting with inability to tolerate food or liquids, persistent diarrhea, or if your pain is not controlled on your discharge pain medications.    FOLLOW-UP:  - Please call to make a follow-up appointment within 1-2 weeks of discharge  with Dr Barajas  - Please follow up with your primary care physician in 1-2 weeks regarding your hospitalization

## 2023-03-13 NOTE — ASU PREOP CHECKLIST - LAST TOOK
Reason for Disposition  • Caller has URGENT medication question about med that PCP prescribed and triager unable to answer question    Protocols used: MEDICATION QUESTION CALL-A-AH     clears

## 2023-03-16 LAB — SURGICAL PATHOLOGY STUDY: SIGNIFICANT CHANGE UP

## 2023-03-29 ENCOUNTER — APPOINTMENT (OUTPATIENT)
Dept: SURGICAL ONCOLOGY | Facility: CLINIC | Age: 68
End: 2023-03-29
Payer: MEDICARE

## 2023-03-29 ENCOUNTER — NON-APPOINTMENT (OUTPATIENT)
Age: 68
End: 2023-03-29

## 2023-03-29 VITALS
BODY MASS INDEX: 29.19 KG/M2 | RESPIRATION RATE: 16 BRPM | OXYGEN SATURATION: 98 % | WEIGHT: 186 LBS | DIASTOLIC BLOOD PRESSURE: 76 MMHG | HEIGHT: 67 IN | HEART RATE: 65 BPM | SYSTOLIC BLOOD PRESSURE: 134 MMHG

## 2023-03-29 PROCEDURE — 99024 POSTOP FOLLOW-UP VISIT: CPT

## 2023-03-29 NOTE — ASSESSMENT
[FreeTextEntry1] : T1cN0 left breast invasive lobular carcinoma, ER/UT+, Her-2 negative\par Left inferior central breast DCIS, ER/UT+\par Left upper outer breast ADH- T1aN0 invasive cancer \par S/p left breast lumpectomy and axillary sentinel node biopsy - left ADH upgraded to invasive carcinoma (T1aN0), all margins and lymph nodes negative on final pathology \par Will refer to medical and radiation oncology for adjuvant treatment options \par RTO 3 months\par Will send two Oncotype Dx tests given 2 separate malignancies\par \par \par \par Enclosed pathology report\par \par

## 2023-03-29 NOTE — CONSULT LETTER
[Dear  ___] : Dear  [unfilled], [Courtesy Letter:] : I had the pleasure of seeing your patient, [unfilled], in my office today. [Please see my note below.] : Please see my note below. [Sincerely,] : Sincerely, [FreeTextEntry3] : Monty Barajas MD FACS\par

## 2023-03-29 NOTE — HISTORY OF PRESENT ILLNESS
[de-identified] : Patient is a 68 y/o female who presents an initial post op for left breast cancers and left breast ADH. She is status post 3 site left breast lumpectomies and left axillary sentinel node biopsy on 3/13/23. No complaints postop. \par \par Final pathology (3/13/23):\par 1.  Breast, left lower inner quadrant, lumpectomy:\par -   Ductal carcinoma in situ, high nuclear grade, solid with comedonecrosis\par -   Focal atypical ductal hyperplasia\par -   Biopsy site change\par 2.  Breast, left lower inner quadrant superior margin, margin resection:-Unremarkable breast tissue\par 3.  Breast, left lower inner quadrant medial margin, margin resection : -Unremarkable breast tissue\par 4.  Breast, left lower inner quadrant inferior margin, margin resection:-Unremarkable fibroadipose tissue\par 5.  Breast, left lower inner quadrant lateral margin, margin resection: -Unremarkable breast tissue\par 6.  Breast, left lower inner quadrant deep margin, margin resection: -Unremarkable skeletal muscle\par 7.  Breast, left lower inner quadrant anterior margin, margin resection:-Unremarkable fibroadipose tissue\par 8.  Breast, left upper outer quadrant, lumpectomy- Invasive ductal carcinoma, moderately differentiated, (4.0 mm)\par -   Biopsy site change\par -   Nonproliferative fibrocystic change\par 9.  Lymph nodes, left axillary sentinel, sentinel node biopsies:-No tumor present in 2 lymph nodes (0/2)\par 10.  Breast, left central, lumpectomy:- Invasive lobular carcinoma, classic type (14.0 mm)\par -   Lobular carcinoma in situ\par -   Biopsy site change\par 11.  Breast, left central superior margin, margin resection:-Proliferative fibrocystic change\par 12.  Breast, left central medial margin, margin resection:-Nonproliferative fibrocystic change\par 13.  Breast, left central inferior margin, margin resection:-Nonproliferative fibrocystic change\par 14.  Breast, left central lateral margin, margin resection:-Unremarkable breast tissue\par 15.  Breast, left central deep margin, margin resection:-Nonproliferative and focal proliferative fibrocystic change\par 16.  Breast, left central anterior margin, margin resection: -Nonproliferative fibrocystic change\par \par \par MRI guided core biopsy 1/12/23:\par Left inferior central breast: DCIS, solid and cribriform pattern with low grade nuclear atypia. ER/SC+ \par Left upper slightly outer breast: breast tissue with focal ADH \par \par Core Biopsy (11/23/22):\par Left breast 2:00, core biopsy:\par -Invasive lobular carcinoma (7 mm in greatest microscopic dimension)\par -ER+/SC+, Her-2 negative\par \par Patient underwent screening mammogram/sonogram on 10/20/22 which showed left breast spiculated mass suspicious for malignancy. No sonographic correlate identified on screening. Also left breast asymmetry. Further unilateral left breast mammogram and ultrasound was performed on 11/9/22 which revealed a left upper outer quadrant, mid third depth 1.1 cm spiculated mass with associated architectural distortion for which a biopsy is recommended. (BI-RADS 5)\par \par Breast MRI 12/30/2022: Known malignancy left breast. As above, wide excision is recommended. If breast conservation is elected, recommend consideration of biopsy of additional site at inferior central breast with expectant management of focus upper outer quadrant (6 month MRI follow up if benign results obtained). No MRI evidence of malignancy right breast. BIRADS-4A\par \par No significant past medical history. She is on Atorvastatin for cholesterol. \par Paternal grandmother had breast cancer.

## 2023-03-29 NOTE — ADDENDUM
[FreeTextEntry1] : I, Nevin Ricardo, acted solely as a scribe for Dr. Monty Barajas on this date 03/29/2023.\par

## 2023-03-29 NOTE — PHYSICAL EXAM
[Normal] : supple, no neck mass and thyroid not enlarged [Normal Neck Lymph Nodes] : normal neck lymph nodes  [Normal Supraclavicular Lymph Nodes] : normal supraclavicular lymph nodes [Normal Groin Lymph Nodes] : normal groin lymph nodes [Normal Axillary Lymph Nodes] : normal axillary lymph nodes [Normal] : oriented to person, place and time, with appropriate affect [de-identified] : left breast incisions healing well.  no evidence of infection.

## 2023-04-17 ENCOUNTER — APPOINTMENT (OUTPATIENT)
Dept: SURGICAL ONCOLOGY | Facility: CLINIC | Age: 68
End: 2023-04-17
Payer: MEDICARE

## 2023-04-17 VITALS
OXYGEN SATURATION: 98 % | HEART RATE: 69 BPM | BODY MASS INDEX: 30.61 KG/M2 | SYSTOLIC BLOOD PRESSURE: 116 MMHG | HEIGHT: 67 IN | WEIGHT: 195 LBS | RESPIRATION RATE: 17 BRPM | DIASTOLIC BLOOD PRESSURE: 72 MMHG | TEMPERATURE: 97.6 F

## 2023-04-17 PROCEDURE — 99024 POSTOP FOLLOW-UP VISIT: CPT

## 2023-04-17 NOTE — ASSESSMENT
[FreeTextEntry1] : T1cN0 left breast invasive lobular carcinoma, ER/IA+, Her-2 negative\par Left inferior central breast DCIS, ER/IA+\par Left upper outer breast ADH- T1aN0 invasive cancer \par S/p left breast lumpectomy and axillary sentinel node biopsy - left ADH upgraded to invasive carcinoma (T1aN0), all margins and lymph nodes negative on final pathology \par \par PLAN:\par Will refer to medical and radiation oncology for adjuvant treatment options \par RTO 3 months\par Will send two Oncotype Dx tests given 2 separate malignancies\par \par

## 2023-04-17 NOTE — PHYSICAL EXAM
[Normal] : supple, no neck mass and thyroid not enlarged [Normal Neck Lymph Nodes] : normal neck lymph nodes  [Normal Supraclavicular Lymph Nodes] : normal supraclavicular lymph nodes [Normal Groin Lymph Nodes] : normal groin lymph nodes [Normal Axillary Lymph Nodes] : normal axillary lymph nodes [Normal] : oriented to person, place and time, with appropriate affect [de-identified] : left breast incisions healing well.  no evidence of infection.

## 2023-04-17 NOTE — HISTORY OF PRESENT ILLNESS
[de-identified] : Patient is a 68 y/o female who presents an initial post op for left breast cancers and left breast ADH. She is status post 3 site left breast lumpectomies and left axillary sentinel node biopsy on 3/13/23. No complaints postop. \par \par Final pathology (3/13/23):\par 1.  Breast, left lower inner quadrant, lumpectomy:\par -   Ductal carcinoma in situ, high nuclear grade, solid with comedonecrosis\par -   Focal atypical ductal hyperplasia\par -   Biopsy site change\par 2.  Breast, left lower inner quadrant superior margin, margin resection:-Unremarkable breast tissue\par 3.  Breast, left lower inner quadrant medial margin, margin resection : -Unremarkable breast tissue\par 4.  Breast, left lower inner quadrant inferior margin, margin resection:-Unremarkable fibroadipose tissue\par 5.  Breast, left lower inner quadrant lateral margin, margin resection: -Unremarkable breast tissue\par 6.  Breast, left lower inner quadrant deep margin, margin resection: -Unremarkable skeletal muscle\par 7.  Breast, left lower inner quadrant anterior margin, margin resection:-Unremarkable fibroadipose tissue\par 8.  Breast, left upper outer quadrant, lumpectomy- Invasive ductal carcinoma, moderately differentiated, (4.0 mm)\par -   Biopsy site change\par -   Nonproliferative fibrocystic change\par 9.  Lymph nodes, left axillary sentinel, sentinel node biopsies:-No tumor present in 2 lymph nodes (0/2)\par 10.  Breast, left central, lumpectomy:- Invasive lobular carcinoma, classic type (14.0 mm)\par -   Lobular carcinoma in situ\par -   Biopsy site change\par 11.  Breast, left central superior margin, margin resection:-Proliferative fibrocystic change\par 12.  Breast, left central medial margin, margin resection:-Nonproliferative fibrocystic change\par 13.  Breast, left central inferior margin, margin resection:-Nonproliferative fibrocystic change\par 14.  Breast, left central lateral margin, margin resection:-Unremarkable breast tissue\par 15.  Breast, left central deep margin, margin resection:-Nonproliferative and focal proliferative fibrocystic change\par 16.  Breast, left central anterior margin, margin resection: -Nonproliferative fibrocystic change\par \par \par MRI guided core biopsy 1/12/23:\par Left inferior central breast: DCIS, solid and cribriform pattern with low grade nuclear atypia. ER/TN+ \par Left upper slightly outer breast: breast tissue with focal ADH \par \par Core Biopsy (11/23/22):\par Left breast 2:00, core biopsy:\par -Invasive lobular carcinoma (7 mm in greatest microscopic dimension)\par -ER+/TN+, Her-2 negative\par \par Patient underwent screening mammogram/sonogram on 10/20/22 which showed left breast spiculated mass suspicious for malignancy. No sonographic correlate identified on screening. Also left breast asymmetry. Further unilateral left breast mammogram and ultrasound was performed on 11/9/22 which revealed a left upper outer quadrant, mid third depth 1.1 cm spiculated mass with associated architectural distortion for which a biopsy is recommended. (BI-RADS 5)\par \par Breast MRI 12/30/2022: Known malignancy left breast. As above, wide excision is recommended. If breast conservation is elected, recommend consideration of biopsy of additional site at inferior central breast with expectant management of focus upper outer quadrant (6 month MRI follow up if benign results obtained). No MRI evidence of malignancy right breast. BIRADS-4A\par \par No significant past medical history. She is on Atorvastatin for cholesterol. \par Paternal grandmother had breast cancer.

## 2023-04-24 ENCOUNTER — APPOINTMENT (OUTPATIENT)
Dept: SURGICAL ONCOLOGY | Facility: CLINIC | Age: 68
End: 2023-04-24
Payer: MEDICARE

## 2023-04-24 VITALS
WEIGHT: 195 LBS | RESPIRATION RATE: 16 BRPM | BODY MASS INDEX: 30.61 KG/M2 | OXYGEN SATURATION: 97 % | DIASTOLIC BLOOD PRESSURE: 73 MMHG | HEIGHT: 67 IN | HEART RATE: 60 BPM | SYSTOLIC BLOOD PRESSURE: 113 MMHG

## 2023-04-24 PROCEDURE — 99213 OFFICE O/P EST LOW 20 MIN: CPT | Mod: 24

## 2023-05-03 ENCOUNTER — OUTPATIENT (OUTPATIENT)
Dept: OUTPATIENT SERVICES | Facility: HOSPITAL | Age: 68
LOS: 1 days | Discharge: HOME | End: 2023-05-03

## 2023-05-03 DIAGNOSIS — Z90.89 ACQUIRED ABSENCE OF OTHER ORGANS: Chronic | ICD-10-CM

## 2023-05-03 DIAGNOSIS — D05.10 INTRADUCTAL CARCINOMA IN SITU OF UNSPECIFIED BREAST: ICD-10-CM

## 2023-05-03 DIAGNOSIS — Z98.890 OTHER SPECIFIED POSTPROCEDURAL STATES: Chronic | ICD-10-CM

## 2023-05-03 DIAGNOSIS — Z90.710 ACQUIRED ABSENCE OF BOTH CERVIX AND UTERUS: Chronic | ICD-10-CM

## 2023-05-10 RX ORDER — SULFAMETHOXAZOLE AND TRIMETHOPRIM 800; 160 MG/1; MG/1
800-160 TABLET ORAL TWICE DAILY
Qty: 20 | Refills: 0 | Status: DISCONTINUED | COMMUNITY
Start: 2023-04-17 | End: 2023-05-10

## 2023-05-12 ENCOUNTER — APPOINTMENT (OUTPATIENT)
Dept: RADIATION ONCOLOGY | Facility: CLINIC | Age: 68
End: 2023-05-12
Payer: MEDICARE

## 2023-05-12 ENCOUNTER — NON-APPOINTMENT (OUTPATIENT)
Age: 68
End: 2023-05-12

## 2023-05-12 ENCOUNTER — OUTPATIENT (OUTPATIENT)
Dept: OUTPATIENT SERVICES | Facility: HOSPITAL | Age: 68
LOS: 1 days | Discharge: HOME | End: 2023-05-12
Payer: MEDICARE

## 2023-05-12 ENCOUNTER — APPOINTMENT (OUTPATIENT)
Dept: HEMATOLOGY ONCOLOGY | Facility: CLINIC | Age: 68
End: 2023-05-12
Payer: MEDICARE

## 2023-05-12 VITALS
HEART RATE: 68 BPM | HEIGHT: 67 IN | RESPIRATION RATE: 17 BRPM | OXYGEN SATURATION: 97 % | SYSTOLIC BLOOD PRESSURE: 125 MMHG | TEMPERATURE: 97.16 F | WEIGHT: 196.98 LBS | BODY MASS INDEX: 30.92 KG/M2 | DIASTOLIC BLOOD PRESSURE: 76 MMHG

## 2023-05-12 VITALS
RESPIRATION RATE: 16 BRPM | TEMPERATURE: 97 F | DIASTOLIC BLOOD PRESSURE: 70 MMHG | SYSTOLIC BLOOD PRESSURE: 115 MMHG | HEART RATE: 61 BPM | OXYGEN SATURATION: 97 %

## 2023-05-12 DIAGNOSIS — Z90.710 ACQUIRED ABSENCE OF BOTH CERVIX AND UTERUS: Chronic | ICD-10-CM

## 2023-05-12 DIAGNOSIS — Z98.890 OTHER SPECIFIED POSTPROCEDURAL STATES: Chronic | ICD-10-CM

## 2023-05-12 DIAGNOSIS — Z78.9 OTHER SPECIFIED HEALTH STATUS: ICD-10-CM

## 2023-05-12 DIAGNOSIS — Z87.19 PERSONAL HISTORY OF OTHER DISEASES OF THE DIGESTIVE SYSTEM: ICD-10-CM

## 2023-05-12 DIAGNOSIS — Z87.891 PERSONAL HISTORY OF NICOTINE DEPENDENCE: ICD-10-CM

## 2023-05-12 DIAGNOSIS — Z92.29 PERSONAL HISTORY OF OTHER DRUG THERAPY: ICD-10-CM

## 2023-05-12 DIAGNOSIS — Z90.89 ACQUIRED ABSENCE OF OTHER ORGANS: Chronic | ICD-10-CM

## 2023-05-12 DIAGNOSIS — Z80.9 FAMILY HISTORY OF MALIGNANT NEOPLASM, UNSPECIFIED: ICD-10-CM

## 2023-05-12 PROCEDURE — 77262 THER RADIOLOGY TX PLNG INTRM: CPT

## 2023-05-12 PROCEDURE — 99205 OFFICE O/P NEW HI 60 MIN: CPT

## 2023-05-12 PROCEDURE — 99204 OFFICE O/P NEW MOD 45 MIN: CPT | Mod: 25

## 2023-05-12 RX ORDER — PANTOPRAZOLE 40 MG/1
40 TABLET, DELAYED RELEASE ORAL
Refills: 0 | Status: ACTIVE | COMMUNITY

## 2023-05-12 RX ORDER — CHOLECALCIFEROL (VITAMIN D3) 25 MCG
TABLET ORAL
Refills: 0 | Status: ACTIVE | COMMUNITY

## 2023-05-12 RX ORDER — PSYLLIUM HUSK 0.4 G
CAPSULE ORAL
Refills: 0 | Status: ACTIVE | COMMUNITY

## 2023-05-12 RX ORDER — LORATADINE 10 MG/1
10 TABLET ORAL
Refills: 0 | Status: ACTIVE | COMMUNITY

## 2023-05-12 NOTE — REVIEW OF SYSTEMS
[Patient Intake Form Reviewed] : Patient intake form was reviewed [Constipation] : constipation [Negative] : Heme/Lymph [de-identified] : healed left breast/axilla scars

## 2023-05-12 NOTE — HISTORY OF PRESENT ILLNESS
[FreeTextEntry1] : Ms. Montejo is a 67 year old English/Estonian speaking female who presents in consultation for consideration of radiation therapy.\par \par Diagnosis:  pT1c pN0 LEFT Breast,  Invasive Ductal Carcinoma (4.0 mm), Invasive Lobular Carcinoma (14.0 mm) and DCIS, high nuclear grade.  All margins negative.  Two lymph nodes with no tumor present (0/2).  ER + (99%) IN + (99%) HER2 - \par Oncotype DX Recurrence Score:  6  (Lobular) \par \par HPI : \par \par 10/20/22 - Bilateral Screening Mammogram/US:  Left breast spiculated mass suspicious for malignancy.  No sonographic correlate identified on screening.  Left breast asymmetry.  No suspicious right breast mammographic findings.  No suspicious sonographic findings seen in either breast.  Additional imaging recommended.   BIRADS 0\par \par 11/9/22 -  Unilateral LEFT Diagnostic Callback Mammogram/US:  Left upper outer quadrant suspicious mass, tomosynthesis guided biopsy recommended.  BIRADS 5 \par \par 11/23/22 - underwent LEFT Breast, 2:00 Biopsy:  Pathology - [facility] review  Invasive moderately differentiated Lobular Carcinoma (9 mm).  ER + (>95%) IN + (>95%) HER2 - \par \par 12/30/22 - Breast MRI:  IMPRESSION:    Known malignancy left breast. As detailed in full report, wide excision is recommended. If breast conservation therapy is elected recommend consideration of biopsy of additional site eg that in the inferior central breast with expectant management of focus upper outer quadrant (6 month MRI follow-up if benign results are obtained)\par No MRI evidence of malignancy right breast.  BIRADS 4A   *** ADDENDUM # 1 ***   Note: Patient is status post MRI guided biopsy 1/12/2023 of the left inferior breast revealing DCIS. Known invasive lobular carcinoma left breast.  If will change surgical management recommend consideration of biopsy of additional prominent focus noted upper slightly outer left breast.\par \par 1/12/23 - underwent LEFT Breast, Inferior central MRI guided Biopsy:  Pathology -  DCIS, solid and cribriform pattern with ow grade nuclear atypia.  Uninvolved breast tissue shows non-proliferative fibrocystic change with apocrine metaplasia.  Microcalcifications not identified.  ER + (95%) IN + (95%)  \par \par 2/2/23 - underwent LEFT Breast, Upper slightly outer MRI guided Biopsy:  Pathology - Breast tissue with focal atypical duct hyperplasia (multiple additional deeper levels were examined).  Dense stromal fibrosis.  Fibrocystic changes with calcifications.  \par \par 3/13/23 - underwent 3 site LEFT Breast Lumpectomies and LEFT Axillary [place] Node Biopsy (Dr. Barajas - surgeon):  Pathology -\par - # 1. LEFT Breast, Lower Inner Quadrant Lumpectomy:  DCIS, high nuclear grade, solid with comedonecrosis.  Focal atypical ductal hyperplasia.  Biopsy site change.  \par - # 2. LEFT Breast, Upper outer Quadrant Lumpectomy:  Invasive Ductal Carcinoma (4.0 mm), moderately differentiated.  Biopsy site change. Nonproliferative fibrocystic change.  \par - # 3. LEFT Breast, Central Lumpectomy:  Invasive Lobular Carcinoma (14.0 mm), classic type. LCIS.  Biopsy site change.\par Oncotype DX Recurrence Score:  6 \par -  LEFT Axillary [place] Node Biopsy:  No tumor present in 2 Lymph nodes (0/2). \par \par 4/17/23 - saw Dr. Barajas (surgeon) in follow up ....  healing well.  Two Oncotype DX tests requested given two separate malignancies.  Referrals to medical and radiation oncology.  \par \par 5/12/23 - presents in consultation for discussion of radiation therapy options.  Ms. Montejo is feeling well today and has healed from her surgery.  Denies any pain.  She looks forward to next steps in her treatment.  She is to also see Dr. Bartlett ([facility]) today in consultation.  \par

## 2023-05-12 NOTE — OB/GYN HISTORY
[Currently In Menopause] : currently in menopause [___] : Total Pregnancies: [unfilled] [History of Hormone Replacement Therapy] : no history of hormone replacement therapy [Experiencing Menopausal Sxs] : not experiencing menopausal symptoms

## 2023-05-12 NOTE — REASON FOR VISIT
[Consideration of Curative Therapy] : consideration of curative therapy for [Breast Cancer] : breast cancer [Friend] : friend [[employer] ] : provided by [employer]   [Interpreters_IDNumber] : [id number] [FreeTextEntry3] : Nichole  [TWNoteComboBox1] : Other

## 2023-05-12 NOTE — VITALS
[Maximal Pain Intensity: 0/10] : 0/10 [Least Pain Intensity: 0/10] : 0/10 [NoTreatment Scheduled] : no treatment scheduled [ECOG Performance Status: 0 - Fully active, able to carry on all pre-disease performance without restriction] : Performance Status: 0 - Fully active, able to carry on all pre-disease performance without restriction [90: Able to carry normal activity; minor signs or symptoms of disease.] : 90: Able to carry normal activity; minor signs or symptoms of disease.  [Date: ____________] : Patient's last distress assessment performed on [unfilled]. [1 - Distress Level] : Distress Level: 1 [Patient given social work contact information and resource sheet] : Patient was given social work contact information and resource sheet

## 2023-05-12 NOTE — PHYSICAL EXAM
[Sclera] : the sclera and conjunctiva were normal [Outer Ear] : the ears and nose were normal in appearance [No UE Edema] : there is no upper extremity edema [Normal] : oriented to person, place and time, the affect was normal, the mood was normal and not anxious [de-identified] : left breast and axillary scars healed well

## 2023-05-14 NOTE — HISTORY OF PRESENT ILLNESS
[Disease: _____________________] : Disease: [unfilled] [T: ___] : T[unfilled] [N: ___] : N[unfilled] [M: ___] : M[unfilled] [100: Normal, no complaints, no evidence of disease.] : 100: Normal, no complaints, no evidence of disease. [ECOG Performance Status: 0 - Fully active, able to carry on all pre-disease performance without restriction] : Performance Status: 0 - Fully active, able to carry on all pre-disease performance without restriction [de-identified] : Patient initially had routine screening bilateral screening mammogram on 10/20/2022 which showed in the left breast a spiculated mass suspicious for malignancy. No sonographic correlate identified on screening. Left breast asymmetry was also noted. Diagnostic mammo-sono on 22 showed suspicious mass in the left upper outer quadrant of the left breast (BIRADS5).\par \par 22 - Left breast biopsy 2:00 0 invasive moderately differentiated lobular carcinoma (9mm ER 95%, NC 95%, Her2 negative).\par \par Pre-operative MRI 22 Known malignancy left breast.  If breast conservation therapy is elected recommend consideration of biopsy of additional site eg that in the inferior central breast with expectant management of focus upper outer quadrant (6 month MRI follow-up if benign results are obtained)\par No MRI evidence of malignancy right breast. BIRADS 4A\par \par MRI guided biopsy 2023 of the left inferior breast revealing DCIS ER-95%, NC-95%. Known invasive lobular carcinoma left breast. If will change surgical management recommend consideration of biopsy of additional prominent focus noted upper slightly outer left breast.\par \par 23 - underwent LEFT Breast, Upper slightly outer MRI guided Biopsy: Pathology - Breast tissue with focal atypical duct hyperplasia (multiple additional deeper levels were examined). Dense stromal fibrosis. Fibrocystic changes with calcifications. \par \par Surgery 3/13/23 - underwent 3 site LEFT Breast Lumpectomies and LEFT Axillary [place] Node Biopsy (Dr. Barajas - surgeon) \par Pathology :\par - # 1. LEFT Breast, Lower Inner Quadrant Lumpectomy: DCIS, high nuclear grade, solid with comedonecrosis. Focal atypical ductal hyperplasia. Biopsy site change. \par - # 2. LEFT Breast, Upper outer Quadrant Lumpectomy: Invasive Ductal Carcinoma (4.0 mm), moderately differentiated. Biopsy site change. Nonproliferative fibrocystic change. \par - # 3. LEFT Breast, Central Lumpectomy: Invasive Lobular Carcinoma (14.0 mm), classic type. LCIS. Biopsy site change.\par \par Oncotype DX Recurrence Score: 6 \par - LEFT Axillary [place] Node Biopsy: No tumor present in 2 Lymph nodes (0/2). \par \par Risk assessment : menarche at 14, , 1st pregnancy at age 31, BRIE in  but unclear if ovaries removed; no OCPs, no HRT; offered genetic testing but she declined\par \par Seen by Radiation Oncology (Dr. Hill today) Plan for Radiation 16 fractions\par  [de-identified] : ER 99%, PR99%, Her 2 negative (0 by IHC) [de-identified] : Oncotype RS 6

## 2023-05-14 NOTE — PHYSICAL EXAM
[Normal] : affect appropriate [de-identified] : well healed left breast roosevelt-areolar incision; well healed left axillary incision site; no palpable lesions in either breast. no skin dimpling or nipple retraction

## 2023-05-14 NOTE — CONSULT LETTER
[Dear  ___] : Dear  [unfilled], [Consult Letter:] : I had the pleasure of evaluating your patient, [unfilled]. [Please see my note below.] : Please see my note below. [Consult Closing:] : Thank you very much for allowing me to participate in the care of this patient.  If you have any questions, please do not hesitate to contact me. [Sincerely,] : Sincerely, [FreeTextEntry3] : Dayday Bartlett MD\par \par Division of Hematology/Oncology\par [facility]\par [address]\[place] [address] \par Tel: [phone]\par Fax: [phone]\par Email: [email or web address]  [DrTeresa  ___] : Dr. MUSTAFA

## 2023-05-14 NOTE — ASSESSMENT
[FreeTextEntry1] : 66 yo woman with multifocal left breast cancer Stage 1A ([id number]), ER+MN+ her2 negative with oncotype 6 s/p multiple site lumpectomies.\par \par - no role for chemotherapy given <1% absolute benefit from chemotherapy\par - will proceed with radiation therapy as recommended by Dr. Kristen Hill\par - upon completion of radiation therapy, will initiate endocrine therapy with aromatase inhibitor. With patient who has Oncotype of 6, with use of endocrine therapy, based on results of TAILORx trail, distant recurrence risk will be reduced by 3%. \par - would start with anastrazole 1 mg daily upon completion of radiation therapy; will plan for 7-10 years of therapy\par - risks/benefits/side effects including but not limited to increased risk of osteoporosis, worsening arthralgia/myalgia, rise in LFTs that will need to be monitored every 6 months and worsening lipid profile that will need to be monitored by PMD \par - to continue mammo/sono annually as per Dr. Barajas\par - patient has never had bone density; script provided to complete Bone density today\par - Patient had the opportunity to have all their questions answered to their satisfaction \par - f/u in 3 months (i.e. about 1 month after starting anastrazole)

## 2023-05-18 PROCEDURE — 77290 THER RAD SIMULAJ FIELD CPLX: CPT | Mod: 26

## 2023-05-18 PROCEDURE — 77333 RADIATION TREATMENT AID(S): CPT | Mod: 26

## 2023-05-25 PROCEDURE — 77295 3-D RADIOTHERAPY PLAN: CPT | Mod: 26

## 2023-05-25 PROCEDURE — 77334 RADIATION TREATMENT AID(S): CPT | Mod: 26

## 2023-05-25 PROCEDURE — 77300 RADIATION THERAPY DOSE PLAN: CPT | Mod: 26

## 2023-06-01 PROCEDURE — 77280 THER RAD SIMULAJ FIELD SMPL: CPT | Mod: 26

## 2023-06-02 PROCEDURE — 77427 RADIATION TX MANAGEMENT X5: CPT

## 2023-06-05 ENCOUNTER — NON-APPOINTMENT (OUTPATIENT)
Age: 68
End: 2023-06-05

## 2023-06-05 NOTE — REASON FOR VISIT
[Routine On-Treatment] : a routine on-treatment visit for [Breast Cancer] : breast cancer [FreeTextEntry3] : Nichole  [TWNoteComboBox1] : Other

## 2023-06-05 NOTE — HISTORY OF PRESENT ILLNESS
[FreeTextEntry1] : Ms. Montejo is a 67 year old English/Vietnamese speaking female who presents for an on treatment visit. \par \par Diagnosis:  pT1c pN0 LEFT Breast,  Invasive Ductal Carcinoma (4.0 mm), Invasive Lobular Carcinoma (14.0 mm) and DCIS, high nuclear grade.  All margins negative.  Two lymph nodes with no tumor present (0/2).  ER + (99%) IL + (99%) HER2 - \par Oncotype DX Recurrence Score:  6  (Lobular) \par Oncotype DX Recurrence Score:  15 (Ductal) \par \par HPI : \par \par 10/20/22 - Bilateral Screening Mammogram/US:  Left breast spiculated mass suspicious for malignancy.  No sonographic correlate identified on screening.  Left breast asymmetry.  No suspicious right breast mammographic findings.  No suspicious sonographic findings seen in either breast.  Additional imaging recommended.   BIRADS 0\par \par 11/9/22 -  Unilateral LEFT Diagnostic Callback Mammogram/US:  Left upper outer quadrant suspicious mass, tomosynthesis guided biopsy recommended.  BIRADS 5 \par \par 11/23/22 - underwent LEFT Breast, 2:00 Biopsy:  Pathology - [facility] review  Invasive moderately differentiated Lobular Carcinoma (9 mm).  ER + (>95%) IL + (>95%) HER2 - \par \par 12/30/22 - Breast MRI:  IMPRESSION:    Known malignancy left breast. As detailed in full report, wide excision is recommended. If breast conservation therapy is elected recommend consideration of biopsy of additional site eg that in the inferior central breast with expectant management of focus upper outer quadrant (6 month MRI follow-up if benign results are obtained)\par No MRI evidence of malignancy right breast.  BIRADS 4A   *** ADDENDUM # 1 ***   Note: Patient is status post MRI guided biopsy 1/12/2023 of the left inferior breast revealing DCIS. Known invasive lobular carcinoma left breast.  If will change surgical management recommend consideration of biopsy of additional prominent focus noted upper slightly outer left breast.\par \par 1/12/23 - underwent LEFT Breast, Inferior central MRI guided Biopsy:  Pathology -  DCIS, solid and cribriform pattern with ow grade nuclear atypia.  Uninvolved breast tissue shows non-proliferative fibrocystic change with apocrine metaplasia.  Microcalcifications not identified.  ER + (95%) IL + (95%)  \par \par 2/2/23 - underwent LEFT Breast, Upper slightly outer MRI guided Biopsy:  Pathology - Breast tissue with focal atypical duct hyperplasia (multiple additional deeper levels were examined).  Dense stromal fibrosis.  Fibrocystic changes with calcifications.  \par \par 3/13/23 - underwent 3 site LEFT Breast Lumpectomies and LEFT Axillary [place] Node Biopsy (Dr. Barajas - surgeon):  Pathology -\par - # 1. LEFT Breast, Lower Inner Quadrant Lumpectomy:  DCIS, high nuclear grade, solid with comedonecrosis.  Focal atypical ductal hyperplasia.  Biopsy site change.  \par - # 2. LEFT Breast, Upper outer Quadrant Lumpectomy:  Invasive Ductal Carcinoma (4.0 mm), moderately differentiated.  Biopsy site change. Nonproliferative fibrocystic change.   Oncotype DX Recurrence Score:  15 (Ductal) \par - # 3. LEFT Breast, Central Lumpectomy:  Invasive Lobular Carcinoma (14.0 mm), classic type. LCIS.  Biopsy site change.  Oncotype DX Recurrence Score:  6 \par -  LEFT Axillary [place] Node Biopsy:  No tumor present in 2 Lymph nodes (0/2). \par \par 4/17/23 - saw Dr. Barajas (surgeon) in follow up ....  healing well.  Two Oncotype DX tests requested given two separate malignancies.  Referrals to medical and radiation oncology.  \par \par 5/12/23 - presented in consultation for discussion of radiation therapy options.  Ms. Montejo is feeling well today and has healed from her surgery.  Denies any pain.  She looks forward to next steps in her treatment.  She is to also see Dr. Bartlett ([facility]) today in consultation.  Recommended adjuvant radiation to the left breast 4240cGy in 16 fractions. \par \par 5/12/23 - saw Dr. Bartlett ([facility]) in consultation ...  no role for chemotherapy recommended endocrine therapy with aromatase inhibitor (Anastrozole) after completion of radiation therapy. \par \par 6/2/23 - started on RADIATION Therapy to LEFT Breast, 4240 cGy in 16 fx \par \par 6/5/23 - OTV 2/16 fx completed.  Skin care reviewed. She denies breast pain or fatigue.

## 2023-06-05 NOTE — PHYSICAL EXAM
[Normal] : well developed, well nourished, in no acute distress [de-identified] : Left lumpectomy sites intact, no erythema.

## 2023-06-05 NOTE — DISEASE MANAGEMENT
[Pathological] : TNM Stage: p [I] : I [TTNM] : 1c [NTNM] : 0 [MTNM] : 0 [de-identified] : 530cGy [de-identified] : [id number] [de-identified] : LEFT Breast

## 2023-06-09 PROCEDURE — 77427 RADIATION TX MANAGEMENT X5: CPT

## 2023-06-11 DIAGNOSIS — Z92.3 PERSONAL HISTORY OF IRRADIATION: ICD-10-CM

## 2023-06-12 ENCOUNTER — NON-APPOINTMENT (OUTPATIENT)
Age: 68
End: 2023-06-12

## 2023-06-12 NOTE — HISTORY OF PRESENT ILLNESS
[FreeTextEntry1] : Ms. Montejo is a 67 year old English/Nepali speaking female who presents for an on treatment visit.   She is unaccompanied for today's visit.  \par \par Diagnosis:  pT1c pN0 LEFT Breast,  Invasive Ductal Carcinoma (4.0 mm), Invasive Lobular Carcinoma (14.0 mm) and DCIS, high nuclear grade.  All margins negative.  Two lymph nodes with no tumor present (0/2).  ER + (99%) SC + (99%) HER2 - \par Oncotype DX Recurrence Score:  6  (Lobular) \par Oncotype DX Recurrence Score:  15 (Ductal) \par \par HPI : \par \par 10/20/22 - Bilateral Screening Mammogram/US:  Left breast spiculated mass suspicious for malignancy.  No sonographic correlate identified on screening.  Left breast asymmetry.  No suspicious right breast mammographic findings.  No suspicious sonographic findings seen in either breast.  Additional imaging recommended.   BIRADS 0\par \par 11/9/22 -  Unilateral LEFT Diagnostic Callback Mammogram/US:  Left upper outer quadrant suspicious mass, tomosynthesis guided biopsy recommended.  BIRADS 5 \par \par 11/23/22 - underwent LEFT Breast, 2:00 Biopsy:  Pathology - [facility] review  Invasive moderately differentiated Lobular Carcinoma (9 mm).  ER + (>95%) SC + (>95%) HER2 - \par \par 12/30/22 - Breast MRI:  IMPRESSION:    Known malignancy left breast. As detailed in full report, wide excision is recommended. If breast conservation therapy is elected recommend consideration of biopsy of additional site eg that in the inferior central breast with expectant management of focus upper outer quadrant (6 month MRI follow-up if benign results are obtained)\par No MRI evidence of malignancy right breast.  BIRADS 4A   *** ADDENDUM # 1 ***   Note: Patient is status post MRI guided biopsy 1/12/2023 of the left inferior breast revealing DCIS. Known invasive lobular carcinoma left breast.  If will change surgical management recommend consideration of biopsy of additional prominent focus noted upper slightly outer left breast.\par \par 1/12/23 - underwent LEFT Breast, Inferior central MRI guided Biopsy:  Pathology -  DCIS, solid and cribriform pattern with ow grade nuclear atypia.  Uninvolved breast tissue shows non-proliferative fibrocystic change with apocrine metaplasia.  Microcalcifications not identified.  ER + (95%) SC + (95%)  \par \par 2/2/23 - underwent LEFT Breast, Upper slightly outer MRI guided Biopsy:  Pathology - Breast tissue with focal atypical duct hyperplasia (multiple additional deeper levels were examined).  Dense stromal fibrosis.  Fibrocystic changes with calcifications.  \par \par 3/13/23 - underwent 3 site LEFT Breast Lumpectomies and LEFT Axillary [place] Node Biopsy (Dr. Barajas - surgeon):  Pathology -\par - # 1. LEFT Breast, Lower Inner Quadrant Lumpectomy:  DCIS, high nuclear grade, solid with comedonecrosis.  Focal atypical ductal hyperplasia.  Biopsy site change.  \par - # 2. LEFT Breast, Upper outer Quadrant Lumpectomy:  Invasive Ductal Carcinoma (4.0 mm), moderately differentiated.  Biopsy site change. Nonproliferative fibrocystic change.   Oncotype DX Recurrence Score:  15 (Ductal) \par - # 3. LEFT Breast, Central Lumpectomy:  Invasive Lobular Carcinoma (14.0 mm), classic type. LCIS.  Biopsy site change.  Oncotype DX Recurrence Score:  6 \par -  LEFT Axillary [place] Node Biopsy:  No tumor present in 2 Lymph nodes (0/2). \par \par 4/17/23 - saw Dr. Barajas (surgeon) in follow up ....  healing well.  Two Oncotype DX tests requested given two separate malignancies.  Referrals to medical and radiation oncology.  \par \par 5/12/23 - presented in consultation for discussion of radiation therapy options.  Ms. Montejo is feeling well today and has healed from her surgery.  Denies any pain.  She looks forward to next steps in her treatment.  She is to also see Dr. Bartlett ([facility]) today in consultation.  Recommended adjuvant radiation to the left breast 4240cGy in 16 fractions. \par \par 5/12/23 - saw Dr. Bartlett ([facility]) in consultation ...  no role for chemotherapy recommended endocrine therapy with aromatase inhibitor (Anastrozole) after completion of radiation therapy. \par \par 6/2/23 - started on RADIATION Therapy to LEFT Breast, 4240 cGy in 16 fx \par \par 6/5/23 - OTV 2/16 fx completed.  Skin care reviewed. She denies breast pain or fatigue.\par \par 6/12/23 - OTV 7/16 fx completed.  Ms. Montejo is doing well with treatment so far.  She has a small area that she states her eczema is acting up (under the left breast)  but overall skin looks good and she is moisturizing twice a day.

## 2023-06-12 NOTE — REVIEW OF SYSTEMS
[Pruritus: Grade 0] : Pruritus: Grade 0 [Dermatitis Radiation: Grade 0] : Dermatitis Radiation: Grade 0 [Fatigue: Grade 0] : Fatigue: Grade 0 [Breast Pain: Grade 0] : Breast Pain: Grade 0 [Cough: Grade 0] : Cough: Grade 0 [Dyspnea: Grade 0] : Dyspnea: Grade 0 [Skin Hyperpigmentation: Grade 1 - Hyperpigmentation covering <10% BSA; no psychosocial impact] : Skin Hyperpigmentation: Grade 1 - Hyperpigmentation covering <10% BSA; no psychosocial impact [FreeTextEntry4] : moisturizing twice a day

## 2023-06-12 NOTE — DISEASE MANAGEMENT
[Pathological] : TNM Stage: p [I] : I [NTNM] : 0 [TTNM] : 1c [MTNM] : 0 [de-identified] : 3996oGy [de-identified] : [id number] [de-identified] : LEFT Breast

## 2023-06-16 PROCEDURE — 77427 RADIATION TX MANAGEMENT X5: CPT

## 2023-06-19 ENCOUNTER — NON-APPOINTMENT (OUTPATIENT)
Age: 68
End: 2023-06-19

## 2023-06-19 NOTE — REVIEW OF SYSTEMS
[Breast Pain: Grade 0] : Breast Pain: Grade 0 [Cough: Grade 0] : Cough: Grade 0 [Dyspnea: Grade 0] : Dyspnea: Grade 0 [Pruritus: Grade 0] : Pruritus: Grade 0 [Skin Hyperpigmentation: Grade 1 - Hyperpigmentation covering <10% BSA; no psychosocial impact] : Skin Hyperpigmentation: Grade 1 - Hyperpigmentation covering <10% BSA; no psychosocial impact [Fatigue: Grade 0] : Fatigue: Grade 0 [Dermatitis Radiation: Grade 1 - Faint erythema or dry desquamation] : Dermatitis Radiation: Grade 1 - Faint erythema or dry desquamation [FreeTextEntry4] : moisturizing twice a day

## 2023-06-19 NOTE — DISEASE MANAGEMENT
[Pathological] : TNM Stage: p [I] : I [TTNM] : 1c [NTNM] : 0 [MTNM] : 0 [de-identified] : 4559cGy [de-identified] : [id number] [de-identified] : LEFT Breast

## 2023-06-19 NOTE — HISTORY OF PRESENT ILLNESS
[FreeTextEntry1] : Ms. Montejo is a 67 year old English/Tajik speaking female who presents for an on treatment visit.   She is unaccompanied for today's visit.  \par \par Diagnosis:  pT1c pN0 LEFT Breast,  Invasive Ductal Carcinoma (4.0 mm), Invasive Lobular Carcinoma (14.0 mm) and DCIS, high nuclear grade.  All margins negative.  Two lymph nodes with no tumor present (0/2).  ER + (99%) MT + (99%) HER2 - \par Oncotype DX Recurrence Score:  6  (Lobular) \par Oncotype DX Recurrence Score:  15 (Ductal) \par \par HPI : \par \par 10/20/22 - Bilateral Screening Mammogram/US:  Left breast spiculated mass suspicious for malignancy.  No sonographic correlate identified on screening.  Left breast asymmetry.  No suspicious right breast mammographic findings.  No suspicious sonographic findings seen in either breast.  Additional imaging recommended.   BIRADS 0\par \par 11/9/22 -  Unilateral LEFT Diagnostic Callback Mammogram/US:  Left upper outer quadrant suspicious mass, tomosynthesis guided biopsy recommended.  BIRADS 5 \par \par 11/23/22 - underwent LEFT Breast, 2:00 Biopsy:  Pathology - [facility] review  Invasive moderately differentiated Lobular Carcinoma (9 mm).  ER + (>95%) MT + (>95%) HER2 - \par \par 12/30/22 - Breast MRI:  IMPRESSION:    Known malignancy left breast. As detailed in full report, wide excision is recommended. If breast conservation therapy is elected recommend consideration of biopsy of additional site eg that in the inferior central breast with expectant management of focus upper outer quadrant (6 month MRI follow-up if benign results are obtained)\par No MRI evidence of malignancy right breast.  BIRADS 4A   *** ADDENDUM # 1 ***   Note: Patient is status post MRI guided biopsy 1/12/2023 of the left inferior breast revealing DCIS. Known invasive lobular carcinoma left breast.  If will change surgical management recommend consideration of biopsy of additional prominent focus noted upper slightly outer left breast.\par \par 1/12/23 - underwent LEFT Breast, Inferior central MRI guided Biopsy:  Pathology -  DCIS, solid and cribriform pattern with ow grade nuclear atypia.  Uninvolved breast tissue shows non-proliferative fibrocystic change with apocrine metaplasia.  Microcalcifications not identified.  ER + (95%) MT + (95%)  \par \par 2/2/23 - underwent LEFT Breast, Upper slightly outer MRI guided Biopsy:  Pathology - Breast tissue with focal atypical duct hyperplasia (multiple additional deeper levels were examined).  Dense stromal fibrosis.  Fibrocystic changes with calcifications.  \par \par 3/13/23 - underwent 3 site LEFT Breast Lumpectomies and LEFT Axillary [place] Node Biopsy (Dr. Barajas - surgeon):  Pathology -\par - # 1. LEFT Breast, Lower Inner Quadrant Lumpectomy:  DCIS, high nuclear grade, solid with comedonecrosis.  Focal atypical ductal hyperplasia.  Biopsy site change.  \par - # 2. LEFT Breast, Upper outer Quadrant Lumpectomy:  Invasive Ductal Carcinoma (4.0 mm), moderately differentiated.  Biopsy site change. Nonproliferative fibrocystic change.   Oncotype DX Recurrence Score:  15 (Ductal) \par - # 3. LEFT Breast, Central Lumpectomy:  Invasive Lobular Carcinoma (14.0 mm), classic type. LCIS.  Biopsy site change.  Oncotype DX Recurrence Score:  6 \par -  LEFT Axillary [place] Node Biopsy:  No tumor present in 2 Lymph nodes (0/2). \par \par 4/17/23 - saw Dr. Barajas (surgeon) in follow up ....  healing well.  Two Oncotype DX tests requested given two separate malignancies.  Referrals to medical and radiation oncology.  \par \par 5/12/23 - presented in consultation for discussion of radiation therapy options.  Ms. Montejo is feeling well today and has healed from her surgery.  Denies any pain.  She looks forward to next steps in her treatment.  She is to also see Dr. Bartlett ([facility]) today in consultation.  Recommended adjuvant radiation to the left breast 4240cGy in 16 fractions. \par \par 5/12/23 - saw Dr. Bartlett ([facility]) in consultation ...  no role for chemotherapy recommended endocrine therapy with aromatase inhibitor (Anastrozole) after completion of radiation therapy. \par \par 6/2/23 - started on RADIATION Therapy to LEFT Breast, 4240 cGy in 16 fx \par \par 6/5/23 - OTV 2/16 fx completed.  Skin care reviewed. She denies breast pain or fatigue.\par \par 6/12/23 - OTV 7/16 fx completed.  Ms. Montejo is doing well with treatment so far.  She has a small area that she states her eczema is acting up (under the left breast)  but overall skin looks good and she is moisturizing twice a day.  \par \par 6/19/23 - OTV 12/16 fx completed.  Ms. Montejo is tolerating treatment well.  Denies any pain.  She continues to moisturize her skin at least twice daily.  Some mild hyperpigmentation and redness noted.  Discharge instructions/folder given and reviewed.  Post treatment evaluation appointment scheduled for 7/31/23.  She is to follow up with Dr. Bartlett ([facility]) after her Bone Density (7/7/23), she knows to call for an appointment, will also reach out to the team via email.

## 2023-06-19 NOTE — PHYSICAL EXAM
[No UE Edema] : there is no upper extremity edema [de-identified] : G1 radiation dermatitis, left breast

## 2023-06-22 VITALS
DIASTOLIC BLOOD PRESSURE: 67 MMHG | HEART RATE: 60 BPM | SYSTOLIC BLOOD PRESSURE: 130 MMHG | OXYGEN SATURATION: 100 % | RESPIRATION RATE: 16 BRPM

## 2023-07-03 ENCOUNTER — NON-APPOINTMENT (OUTPATIENT)
Age: 68
End: 2023-07-03

## 2023-07-05 ENCOUNTER — NON-APPOINTMENT (OUTPATIENT)
Age: 68
End: 2023-07-05

## 2023-07-05 ENCOUNTER — APPOINTMENT (OUTPATIENT)
Dept: SURGICAL ONCOLOGY | Facility: CLINIC | Age: 68
End: 2023-07-05
Payer: MEDICARE

## 2023-07-05 VITALS
RESPIRATION RATE: 17 BRPM | DIASTOLIC BLOOD PRESSURE: 75 MMHG | HEIGHT: 67 IN | SYSTOLIC BLOOD PRESSURE: 115 MMHG | WEIGHT: 198 LBS | HEART RATE: 70 BPM | BODY MASS INDEX: 31.08 KG/M2 | OXYGEN SATURATION: 97 %

## 2023-07-05 PROCEDURE — 99214 OFFICE O/P EST MOD 30 MIN: CPT

## 2023-07-05 RX ORDER — EMOLLIENT COMBINATION NO.10
EMULSION (GRAM) TOPICAL DAILY
Qty: 0 | Refills: 0 | Status: COMPLETED | OUTPATIENT
Start: 2023-07-05

## 2023-07-05 RX ORDER — SILVER SULFADIAZINE 10 MG/G
1 CREAM TOPICAL TWICE DAILY
Qty: 0 | Refills: 0 | Status: COMPLETED | OUTPATIENT
Start: 2023-07-05

## 2023-07-05 NOTE — ADDENDUM
[FreeTextEntry1] : I, Nevin Ricardo, acted solely as a scribe for Dr. Monty Barajas on this date 07/05/2023.

## 2023-07-05 NOTE — ASSESSMENT
[FreeTextEntry1] : T1cN0 left breast invasive lobular carcinoma, ER/AK+, Her-2 negative\par Left inferior central breast DCIS, ER/AK+\par Left upper outer breast ADH- T1aN0 invasive cancer \par S/p 3 site left breast lumpectomy and axillary sentinel node biopsy - left ADH upgraded to invasive carcinoma (T1aN0), all margins and lymph nodes negative on final pathology \par Completed radiation therapy June 2023 \par Pt will begin Anastrozole after her bone density scan \par Referred back to Dr. Hill of [facility] who will see the patient today for left breast post radiation changes at [facility]\par RTO 3 months \par

## 2023-07-05 NOTE — HISTORY OF PRESENT ILLNESS
[de-identified] : Patient is a 66 y/o female who presents a follow up for left breast cancers and left breast ADH. She is status post 3 site left breast lumpectomies and left axillary sentinel node biopsy on 3/13/23. She completed radiation treatment on 6/23/23. Pt is scheduled for a bone density this Friday 7/7/23 and will begin on hormonal therapy. \par \par Final pathology (3/13/23):\par 1.  Breast, left lower inner quadrant, lumpectomy:\par -   Ductal carcinoma in situ, high nuclear grade, solid with comedonecrosis\par -   Focal atypical ductal hyperplasia\par -   Biopsy site change\par 2.  Breast, left lower inner quadrant superior margin, margin resection:-Unremarkable breast tissue\par 3.  Breast, left lower inner quadrant medial margin, margin resection : -Unremarkable breast tissue\par 4.  Breast, left lower inner quadrant inferior margin, margin resection:-Unremarkable fibroadipose tissue\par 5.  Breast, left lower inner quadrant lateral margin, margin resection: -Unremarkable breast tissue\par 6.  Breast, left lower inner quadrant deep margin, margin resection: -Unremarkable skeletal muscle\par 7.  Breast, left lower inner quadrant anterior margin, margin resection:-Unremarkable fibroadipose tissue\par 8.  Breast, left upper outer quadrant, lumpectomy- Invasive ductal carcinoma, moderately differentiated, (4.0 mm)\par -   Biopsy site change\par -   Nonproliferative fibrocystic change\par 9.  Lymph nodes, left axillary sentinel, sentinel node biopsies:-No tumor present in 2 lymph nodes (0/2)\par 10.  Breast, left central, lumpectomy:- Invasive lobular carcinoma, classic type (14.0 mm)\par -   Lobular carcinoma in situ\par -   Biopsy site change\par 11.  Breast, left central superior margin, margin resection:-Proliferative fibrocystic change\par 12.  Breast, left central medial margin, margin resection:-Nonproliferative fibrocystic change\par 13.  Breast, left central inferior margin, margin resection:-Nonproliferative fibrocystic change\par 14.  Breast, left central lateral margin, margin resection:-Unremarkable breast tissue\par 15.  Breast, left central deep margin, margin resection:-Nonproliferative and focal proliferative fibrocystic change\par 16.  Breast, left central anterior margin, margin resection: -Nonproliferative fibrocystic change\par \par \par MRI guided core biopsy 1/12/23:\par Left inferior central breast: DCIS, solid and cribriform pattern with low grade nuclear atypia. ER/KY+ \par Left upper slightly outer breast: breast tissue with focal ADH \par \par Core Biopsy (11/23/22):\par Left breast 2:00, core biopsy:\par -Invasive lobular carcinoma (7 mm in greatest microscopic dimension)\par -ER+/KY+, Her-2 negative\par \par Patient underwent screening mammogram/sonogram on 10/20/22 which showed left breast spiculated mass suspicious for malignancy. No sonographic correlate identified on screening. Also left breast asymmetry. Further unilateral left breast mammogram and ultrasound was performed on 11/9/22 which revealed a left upper outer quadrant, mid third depth 1.1 cm spiculated mass with associated architectural distortion for which a biopsy is recommended. (BI-RADS 5)\par \par Breast MRI 12/30/2022: Known malignancy left breast. As above, wide excision is recommended. If breast conservation is elected, recommend consideration of biopsy of additional site at inferior central breast with expectant management of focus upper outer quadrant (6 month MRI follow up if benign results obtained). No MRI evidence of malignancy right breast. BIRADS-4A\par \par No significant past medical history. She is on Atorvastatin for cholesterol. \par Paternal grandmother had breast cancer.

## 2023-07-05 NOTE — PHYSICAL EXAM
[Normal] : supple, no neck mass and thyroid not enlarged [Normal Neck Lymph Nodes] : normal neck lymph nodes  [Normal Supraclavicular Lymph Nodes] : normal supraclavicular lymph nodes [Normal Groin Lymph Nodes] : normal groin lymph nodes [Normal Axillary Lymph Nodes] : normal axillary lymph nodes [Normal] : oriented to person, place and time, with appropriate affect [de-identified] : post radiation changes left breast. left areolar and axillary scars well healed.  no masses or adenopathy bilaterally

## 2023-07-07 ENCOUNTER — APPOINTMENT (OUTPATIENT)
Dept: RADIOLOGY | Facility: IMAGING CENTER | Age: 68
End: 2023-07-07
Payer: MEDICARE

## 2023-07-07 ENCOUNTER — OUTPATIENT (OUTPATIENT)
Dept: OUTPATIENT SERVICES | Facility: HOSPITAL | Age: 68
LOS: 1 days | End: 2023-07-07
Payer: MEDICARE

## 2023-07-07 DIAGNOSIS — Z90.89 ACQUIRED ABSENCE OF OTHER ORGANS: Chronic | ICD-10-CM

## 2023-07-07 DIAGNOSIS — Z98.890 OTHER SPECIFIED POSTPROCEDURAL STATES: Chronic | ICD-10-CM

## 2023-07-07 DIAGNOSIS — Z00.00 ENCOUNTER FOR GENERAL ADULT MEDICAL EXAMINATION WITHOUT ABNORMAL FINDINGS: ICD-10-CM

## 2023-07-07 DIAGNOSIS — Z90.710 ACQUIRED ABSENCE OF BOTH CERVIX AND UTERUS: Chronic | ICD-10-CM

## 2023-07-07 DIAGNOSIS — C50.912 MALIGNANT NEOPLASM OF UNSPECIFIED SITE OF LEFT FEMALE BREAST: ICD-10-CM

## 2023-07-07 PROCEDURE — 77080 DXA BONE DENSITY AXIAL: CPT

## 2023-07-07 PROCEDURE — 77080 DXA BONE DENSITY AXIAL: CPT | Mod: 26

## 2023-07-31 ENCOUNTER — APPOINTMENT (OUTPATIENT)
Dept: RADIATION ONCOLOGY | Facility: CLINIC | Age: 68
End: 2023-07-31
Payer: MEDICARE

## 2023-07-31 VITALS
WEIGHT: 196.87 LBS | BODY MASS INDEX: 30.9 KG/M2 | OXYGEN SATURATION: 98 % | TEMPERATURE: 95.36 F | RESPIRATION RATE: 17 BRPM | DIASTOLIC BLOOD PRESSURE: 72 MMHG | SYSTOLIC BLOOD PRESSURE: 120 MMHG | HEIGHT: 67 IN | HEART RATE: 65 BPM

## 2023-07-31 PROCEDURE — 99024 POSTOP FOLLOW-UP VISIT: CPT

## 2023-07-31 RX ORDER — VALERIAN ROOT 250 MG
CAPSULE ORAL
Refills: 0 | Status: DISCONTINUED | COMMUNITY
End: 2023-07-31

## 2023-07-31 RX ORDER — TRIAMCINOLONE ACETONIDE 5 MG/G
0.5 OINTMENT TOPICAL 3 TIMES DAILY
Qty: 1 | Refills: 1 | Status: DISCONTINUED | COMMUNITY
Start: 2023-07-05 | End: 2023-07-31

## 2023-07-31 RX ORDER — BETAMETHASONE DIPROPIONATE 0.5 MG/G
0.05 CREAM, AUGMENTED TOPICAL
Qty: 50 | Refills: 0 | Status: ACTIVE | COMMUNITY
Start: 2023-07-20

## 2023-07-31 RX ORDER — ALPRAZOLAM 0.25 MG/1
0.25 TABLET ORAL
Qty: 2 | Refills: 0 | Status: DISCONTINUED | COMMUNITY
Start: 2023-01-30 | End: 2023-07-31

## 2023-07-31 NOTE — REVIEW OF SYSTEMS
[Fatigue: Grade 0] : Fatigue: Grade 0 [Breast Pain: Grade 0] : Breast Pain: Grade 0 [Cough: Grade 0] : Cough: Grade 0 [Dyspnea: Grade 0] : Dyspnea: Grade 0 [Pruritus: Grade 1 - Mild or localized; topical intervention indicated] : Pruritus: Grade 1 - Mild or localized; topical intervention indicated [Skin Hyperpigmentation: Grade 1 - Hyperpigmentation covering <10% BSA; no psychosocial impact] : Skin Hyperpigmentation: Grade 1 - Hyperpigmentation covering <10% BSA; no psychosocial impact [Dermatitis Radiation: Grade 0] : Dermatitis Radiation: Grade 0 [FreeTextEntry4] : moisturizing at least once a day

## 2023-08-09 ENCOUNTER — OUTPATIENT (OUTPATIENT)
Dept: OUTPATIENT SERVICES | Facility: HOSPITAL | Age: 68
LOS: 1 days | Discharge: HOME | End: 2023-08-09

## 2023-08-09 DIAGNOSIS — Z90.89 ACQUIRED ABSENCE OF OTHER ORGANS: Chronic | ICD-10-CM

## 2023-08-09 DIAGNOSIS — Z98.890 OTHER SPECIFIED POSTPROCEDURAL STATES: Chronic | ICD-10-CM

## 2023-08-09 DIAGNOSIS — Z90.710 ACQUIRED ABSENCE OF BOTH CERVIX AND UTERUS: Chronic | ICD-10-CM

## 2023-08-17 ENCOUNTER — RESULT REVIEW (OUTPATIENT)
Age: 68
End: 2023-08-17

## 2023-08-17 ENCOUNTER — APPOINTMENT (OUTPATIENT)
Dept: HEMATOLOGY ONCOLOGY | Facility: CLINIC | Age: 68
End: 2023-08-17
Payer: MEDICARE

## 2023-08-17 VITALS
OXYGEN SATURATION: 97 % | TEMPERATURE: 97.1 F | WEIGHT: 199.74 LBS | HEART RATE: 70 BPM | BODY MASS INDEX: 31.35 KG/M2 | HEIGHT: 66.93 IN | RESPIRATION RATE: 17 BRPM | SYSTOLIC BLOOD PRESSURE: 121 MMHG | DIASTOLIC BLOOD PRESSURE: 73 MMHG

## 2023-08-17 DIAGNOSIS — C50.912 MALIGNANT NEOPLASM OF UNSPECIFIED SITE OF LEFT FEMALE BREAST: ICD-10-CM

## 2023-08-17 DIAGNOSIS — M85.80 OTHER SPECIFIED DISORDERS OF BONE DENSITY AND STRUCTURE, UNSPECIFIED SITE: ICD-10-CM

## 2023-08-17 DIAGNOSIS — Z78.0 OTHER SPECIFIED DISORDERS OF BONE DENSITY AND STRUCTURE, UNSPECIFIED SITE: ICD-10-CM

## 2023-08-17 LAB
BASOPHILS # BLD AUTO: 0.04 K/UL — SIGNIFICANT CHANGE UP (ref 0–0.2)
BASOPHILS NFR BLD AUTO: 0.7 % — SIGNIFICANT CHANGE UP (ref 0–2)
EOSINOPHIL # BLD AUTO: 0.19 K/UL — SIGNIFICANT CHANGE UP (ref 0–0.5)
EOSINOPHIL NFR BLD AUTO: 3.4 % — SIGNIFICANT CHANGE UP (ref 0–6)
HCT VFR BLD CALC: 37.8 % — SIGNIFICANT CHANGE UP (ref 34.5–45)
HGB BLD-MCNC: 12.5 G/DL — SIGNIFICANT CHANGE UP (ref 11.5–15.5)
IMM GRANULOCYTES NFR BLD AUTO: 0.2 % — SIGNIFICANT CHANGE UP (ref 0–0.9)
LYMPHOCYTES # BLD AUTO: 1.84 K/UL — SIGNIFICANT CHANGE UP (ref 1–3.3)
LYMPHOCYTES # BLD AUTO: 32.9 % — SIGNIFICANT CHANGE UP (ref 13–44)
MCHC RBC-ENTMCNC: 27.6 PG — SIGNIFICANT CHANGE UP (ref 27–34)
MCHC RBC-ENTMCNC: 33.1 G/DL — SIGNIFICANT CHANGE UP (ref 32–36)
MCV RBC AUTO: 83.4 FL — SIGNIFICANT CHANGE UP (ref 80–100)
MONOCYTES # BLD AUTO: 0.52 K/UL — SIGNIFICANT CHANGE UP (ref 0–0.9)
MONOCYTES NFR BLD AUTO: 9.3 % — SIGNIFICANT CHANGE UP (ref 2–14)
NEUTROPHILS # BLD AUTO: 3 K/UL — SIGNIFICANT CHANGE UP (ref 1.8–7.4)
NEUTROPHILS NFR BLD AUTO: 53.5 % — SIGNIFICANT CHANGE UP (ref 43–77)
NRBC # BLD: 0 /100 WBCS — SIGNIFICANT CHANGE UP (ref 0–0)
PLATELET # BLD AUTO: 209 K/UL — SIGNIFICANT CHANGE UP (ref 150–400)
RBC # BLD: 4.53 M/UL — SIGNIFICANT CHANGE UP (ref 3.8–5.2)
RBC # FLD: 13.8 % — SIGNIFICANT CHANGE UP (ref 10.3–14.5)
WBC # BLD: 5.6 K/UL — SIGNIFICANT CHANGE UP (ref 3.8–10.5)
WBC # FLD AUTO: 5.6 K/UL — SIGNIFICANT CHANGE UP (ref 3.8–10.5)

## 2023-08-17 PROCEDURE — 99214 OFFICE O/P EST MOD 30 MIN: CPT

## 2023-08-17 RX ORDER — EMOLLIENT COMBINATION NO.10
EMULSION (GRAM) TOPICAL DAILY
Qty: 1 | Refills: 0 | Status: ACTIVE | COMMUNITY
Start: 2023-08-17 | End: 1900-01-01

## 2023-08-17 NOTE — CONSULT LETTER
[Dear  ___] : Dear  [unfilled], [Consult Letter:] : I had the pleasure of evaluating your patient, [unfilled]. [Please see my note below.] : Please see my note below. [Consult Closing:] : Thank you very much for allowing me to participate in the care of this patient.  If you have any questions, please do not hesitate to contact me. [Sincerely,] : Sincerely, [DrTeresa  ___] : Dr. MUSTAFA [FreeTextEntry3] : Dayday Bartlett MD\par  \par  Division of Hematology/Oncology\par  [facility]\par  [address]\[place] [address] \par  Tel: [phone]\par  Fax: [phone]\par  Email: [email or web address]  Hemigard Postcare Instructions: The HEMIGARD strips are to remain completely dry for at least 5-7 days.

## 2023-08-17 NOTE — HISTORY OF PRESENT ILLNESS
[Disease: _____________________] : Disease: [unfilled] [T: ___] : T[unfilled] [N: ___] : N[unfilled] [M: ___] : M[unfilled] [100: Normal, no complaints, no evidence of disease.] : 100: Normal, no complaints, no evidence of disease. [ECOG Performance Status: 0 - Fully active, able to carry on all pre-disease performance without restriction] : Performance Status: 0 - Fully active, able to carry on all pre-disease performance without restriction [de-identified] : Patient initially had routine screening bilateral screening mammogram on 10/20/2022 which showed in the left breast a spiculated mass suspicious for malignancy. No sonographic correlate identified on screening. Left breast asymmetry was also noted. Diagnostic mammo-sono on 22 showed suspicious mass in the left upper outer quadrant of the left breast (BIRADS5).\par  \par  22 - Left breast biopsy 2:00 0 invasive moderately differentiated lobular carcinoma (9mm ER 95%, NV 95%, Her2 negative).\par  \par  Pre-operative MRI 22 Known malignancy left breast.  If breast conservation therapy is elected recommend consideration of biopsy of additional site eg that in the inferior central breast with expectant management of focus upper outer quadrant (6 month MRI follow-up if benign results are obtained)\par  No MRI evidence of malignancy right breast. BIRADS 4A\par  \par  MRI guided biopsy 2023 of the left inferior breast revealing DCIS ER-95%, NV-95%. Known invasive lobular carcinoma left breast. If will change surgical management recommend consideration of biopsy of additional prominent focus noted upper slightly outer left breast.\par  \par  23 - underwent LEFT Breast, Upper slightly outer MRI guided Biopsy: Pathology - Breast tissue with focal atypical duct hyperplasia (multiple additional deeper levels were examined). Dense stromal fibrosis. Fibrocystic changes with calcifications. \par  \par  Surgery 3/13/23 - underwent 3 site LEFT Breast Lumpectomies and LEFT Axillary [place] Node Biopsy (Dr. Barajas - surgeon) \par  Pathology :\par  - # 1. LEFT Breast, Lower Inner Quadrant Lumpectomy: DCIS, high nuclear grade, solid with comedonecrosis. Focal atypical ductal hyperplasia. Biopsy site change. \par  - # 2. LEFT Breast, Upper outer Quadrant Lumpectomy: Invasive Ductal Carcinoma (4.0 mm), moderately differentiated. Biopsy site change. Nonproliferative fibrocystic change. \par  - # 3. LEFT Breast, Central Lumpectomy: Invasive Lobular Carcinoma (14.0 mm), classic type. LCIS. Biopsy site change.\par  \par  Oncotype DX Recurrence Score: 6 \par  - LEFT Axillary [place] Node Biopsy: No tumor present in 2 Lymph nodes (0/2). \par  \par  Risk assessment : menarche at 14, , 1st pregnancy at age 31, BRIE in  but unclear if ovaries removed; no OCPs, no HRT; offered genetic testing but she declined\par  \par  Seen by Radiation Oncology (Dr. Hill today) Plan for Radiation 16 fractions\par   [de-identified] : ER 99%, PR99%, Her 2 negative (0 by IHC) [de-identified] : 8/17/2023 Patient presents today to assess for evidence of breast cancer recurrence and assess treatment toxicity. Started anastrozole 6/23/2023 c/o chronic right knee pain - no change - uses naproxen sparingly - sees ortho c/o increased night sweats -no intefering with QOL Patient denies any breast masses, breast tenderness, skin changes or nipple discharge. Patient denies any SOB, CP, abdominal pain, bone pain, headache, or unexplained weight loss  Imaging Bone Density: 7/7/2023 mild osteopenia T -1.3  f/up 2 years Mammo/Sono due 12/2023 per Dr. Barajas [de-identified] : Oncotype RS 6

## 2023-08-17 NOTE — PHYSICAL EXAM
[Normal] : affect appropriate [de-identified] : s/p left lumpectomy with surgical scars well healed; hyperpigmentation of left areola secondary to RT  no palpable lesions in either breast. no skin dimpling or nipple retraction

## 2023-08-17 NOTE — ASSESSMENT
[FreeTextEntry1] : 66 yo woman with multifocal left breast cancer Stage 1A ([id number]), ER+HI+ her2 negative with oncotype 6 s/p multiple site lumpectomies.  no role for chemotherapy given <1% absolute benefit from chemotherapy; - completed radiation therapy as recommended by Dr. Kristen Hill 6/2023  - continue anastrozole 1 mg daily ; plan 7-10 years - ROSA x 6 months - to continue mammo/sono annually as per Dr. Barajas - due 12/2023 - rx available - baseline bw today - Patient had the opportunity to have all their questions answered to their satisfaction  - f/u in 3 months  Hot flashes - not interfering w. QOL - patient to notify us if she is interested in any meds to reduce night sweats  Osteopenia - mild T -1.3 - Vitamin D and weight bearing exercises recommended

## 2023-08-18 LAB
ALBUMIN SERPL ELPH-MCNC: 4.6 G/DL
ALP BLD-CCNC: 70 U/L
ALT SERPL-CCNC: 34 U/L
ANION GAP SERPL CALC-SCNC: 14 MMOL/L
AST SERPL-CCNC: 22 U/L
BILIRUB SERPL-MCNC: 0.2 MG/DL
BUN SERPL-MCNC: 16 MG/DL
CALCIUM SERPL-MCNC: 9.8 MG/DL
CHLORIDE SERPL-SCNC: 100 MMOL/L
CO2 SERPL-SCNC: 24 MMOL/L
CREAT SERPL-MCNC: 0.63 MG/DL
EGFR: 98 ML/MIN/1.73M2
GLUCOSE SERPL-MCNC: 88 MG/DL
POTASSIUM SERPL-SCNC: 4.3 MMOL/L
PROT SERPL-MCNC: 7.2 G/DL
SODIUM SERPL-SCNC: 138 MMOL/L

## 2023-08-25 DIAGNOSIS — D05.12 INTRADUCTAL CARCINOMA IN SITU OF LEFT BREAST: ICD-10-CM

## 2023-10-01 PROBLEM — Z92.3 HISTORY OF RADIATION THERAPY: Status: RESOLVED | Noted: 2023-06-11 | Resolved: 2023-10-01

## 2023-11-08 ENCOUNTER — OUTPATIENT (OUTPATIENT)
Dept: OUTPATIENT SERVICES | Facility: HOSPITAL | Age: 68
LOS: 1 days | Discharge: HOME | End: 2023-11-08

## 2023-11-08 DIAGNOSIS — Z98.890 OTHER SPECIFIED POSTPROCEDURAL STATES: Chronic | ICD-10-CM

## 2023-11-08 DIAGNOSIS — Z90.710 ACQUIRED ABSENCE OF BOTH CERVIX AND UTERUS: Chronic | ICD-10-CM

## 2023-11-08 DIAGNOSIS — D05.10 INTRADUCTAL CARCINOMA IN SITU OF UNSPECIFIED BREAST: ICD-10-CM

## 2023-11-08 DIAGNOSIS — Z90.89 ACQUIRED ABSENCE OF OTHER ORGANS: Chronic | ICD-10-CM

## 2023-11-17 NOTE — ASU PREOP CHECKLIST - AS BP NONINV METHOD
No care due was identified.  [facility] Embedded Care Due Messages. Reference number: [id number].   11/17/2023 9:00:46 AM CST   electronic

## 2023-11-24 ENCOUNTER — APPOINTMENT (OUTPATIENT)
Dept: HEMATOLOGY ONCOLOGY | Facility: CLINIC | Age: 68
End: 2023-11-24
Payer: MEDICARE

## 2023-11-24 VITALS
OXYGEN SATURATION: 98 % | WEIGHT: 196.21 LBS | SYSTOLIC BLOOD PRESSURE: 125 MMHG | TEMPERATURE: 97.4 F | RESPIRATION RATE: 16 BRPM | DIASTOLIC BLOOD PRESSURE: 77 MMHG | BODY MASS INDEX: 30.8 KG/M2 | HEART RATE: 72 BPM

## 2023-11-24 DIAGNOSIS — C50.912 MALIGNANT NEOPLASM OF UNSPECIFIED SITE OF LEFT FEMALE BREAST: ICD-10-CM

## 2023-11-24 PROCEDURE — 99214 OFFICE O/P EST MOD 30 MIN: CPT

## 2024-01-12 ENCOUNTER — RESULT REVIEW (OUTPATIENT)
Age: 69
End: 2024-01-12

## 2024-01-12 ENCOUNTER — APPOINTMENT (OUTPATIENT)
Dept: MAMMOGRAPHY | Facility: IMAGING CENTER | Age: 69
End: 2024-01-12
Payer: MEDICARE

## 2024-01-12 ENCOUNTER — OUTPATIENT (OUTPATIENT)
Dept: OUTPATIENT SERVICES | Facility: HOSPITAL | Age: 69
LOS: 1 days | End: 2024-01-12
Payer: MEDICARE

## 2024-01-12 ENCOUNTER — APPOINTMENT (OUTPATIENT)
Dept: ULTRASOUND IMAGING | Facility: IMAGING CENTER | Age: 69
End: 2024-01-12
Payer: MEDICARE

## 2024-01-12 DIAGNOSIS — Z90.710 ACQUIRED ABSENCE OF BOTH CERVIX AND UTERUS: Chronic | ICD-10-CM

## 2024-01-12 DIAGNOSIS — Z98.890 OTHER SPECIFIED POSTPROCEDURAL STATES: Chronic | ICD-10-CM

## 2024-01-12 DIAGNOSIS — C50.912 MALIGNANT NEOPLASM OF UNSPECIFIED SITE OF LEFT FEMALE BREAST: ICD-10-CM

## 2024-01-12 DIAGNOSIS — Z90.89 ACQUIRED ABSENCE OF OTHER ORGANS: Chronic | ICD-10-CM

## 2024-01-12 PROCEDURE — 77066 DX MAMMO INCL CAD BI: CPT

## 2024-01-12 PROCEDURE — G0279: CPT | Mod: 26

## 2024-01-12 PROCEDURE — 76641 ULTRASOUND BREAST COMPLETE: CPT | Mod: 26,50

## 2024-01-12 PROCEDURE — 76641 ULTRASOUND BREAST COMPLETE: CPT

## 2024-01-12 PROCEDURE — 77066 DX MAMMO INCL CAD BI: CPT | Mod: 26

## 2024-01-12 PROCEDURE — G0279: CPT

## 2024-02-09 ENCOUNTER — NON-APPOINTMENT (OUTPATIENT)
Age: 69
End: 2024-02-09

## 2024-03-15 ENCOUNTER — RX RENEWAL (OUTPATIENT)
Age: 69
End: 2024-03-15

## 2024-03-15 RX ORDER — ANASTROZOLE TABLETS 1 MG/1
1 TABLET ORAL
Qty: 90 | Refills: 0 | Status: ACTIVE | COMMUNITY
Start: 2023-05-12 | End: 1900-01-01

## 2024-05-15 ENCOUNTER — OUTPATIENT (OUTPATIENT)
Dept: OUTPATIENT SERVICES | Facility: HOSPITAL | Age: 69
LOS: 1 days | Discharge: HOME | End: 2024-05-15

## 2024-05-15 DIAGNOSIS — Z90.89 ACQUIRED ABSENCE OF OTHER ORGANS: Chronic | ICD-10-CM

## 2024-05-15 DIAGNOSIS — Z90.710 ACQUIRED ABSENCE OF BOTH CERVIX AND UTERUS: Chronic | ICD-10-CM

## 2024-05-15 DIAGNOSIS — Z98.890 OTHER SPECIFIED POSTPROCEDURAL STATES: Chronic | ICD-10-CM

## 2024-05-15 DIAGNOSIS — D05.10 INTRADUCTAL CARCINOMA IN SITU OF UNSPECIFIED BREAST: ICD-10-CM

## 2024-05-20 ENCOUNTER — NON-APPOINTMENT (OUTPATIENT)
Age: 69
End: 2024-05-20

## 2024-05-21 ENCOUNTER — APPOINTMENT (OUTPATIENT)
Dept: HEMATOLOGY ONCOLOGY | Facility: CLINIC | Age: 69
End: 2024-05-21
Payer: MEDICARE

## 2024-05-21 VITALS
BODY MASS INDEX: 30.8 KG/M2 | DIASTOLIC BLOOD PRESSURE: 66 MMHG | HEART RATE: 60 BPM | SYSTOLIC BLOOD PRESSURE: 116 MMHG | OXYGEN SATURATION: 97 % | WEIGHT: 196.21 LBS | TEMPERATURE: 98.2 F | RESPIRATION RATE: 16 BRPM

## 2024-05-21 DIAGNOSIS — D05.12 INTRADUCTAL CARCINOMA IN SITU OF LEFT BREAST: ICD-10-CM

## 2024-05-21 DIAGNOSIS — C50.912 MALIGNANT NEOPLASM OF UNSPECIFIED SITE OF LEFT FEMALE BREAST: ICD-10-CM

## 2024-05-21 DIAGNOSIS — Z79.899 OTHER LONG TERM (CURRENT) DRUG THERAPY: ICD-10-CM

## 2024-05-21 PROCEDURE — G2211 COMPLEX E/M VISIT ADD ON: CPT

## 2024-05-21 PROCEDURE — 99214 OFFICE O/P EST MOD 30 MIN: CPT

## 2024-05-21 RX ORDER — AMLODIPINE BESYLATE 2.5 MG/1
2.5 TABLET ORAL
Refills: 0 | Status: ACTIVE | COMMUNITY
Start: 2024-05-21

## 2024-05-22 PROBLEM — D05.12 DUCTAL CARCINOMA IN SITU (DCIS) OF LEFT BREAST: Status: ACTIVE | Noted: 2023-05-12

## 2024-05-22 PROBLEM — Z79.899 HIGH RISK MEDICATION USE: Status: ACTIVE | Noted: 2023-08-17

## 2024-05-22 PROBLEM — C50.912 BREAST CANCER, LEFT: Status: ACTIVE | Noted: 2022-12-09

## 2024-05-22 NOTE — HISTORY OF PRESENT ILLNESS
[Disease: _____________________] : Disease: [unfilled] [T: ___] : T[unfilled] [N: ___] : N[unfilled] [M: ___] : M[unfilled] [100: Normal, no complaints, no evidence of disease.] : 100: Normal, no complaints, no evidence of disease. [ECOG Performance Status: 0 - Fully active, able to carry on all pre-disease performance without restriction] : Performance Status: 0 - Fully active, able to carry on all pre-disease performance without restriction [de-identified] : Patient initially had routine screening bilateral screening mammogram on 10/20/2022 which showed in the left breast a spiculated mass suspicious for malignancy. No sonographic correlate identified on screening. Left breast asymmetry was also noted. Diagnostic mammo-sono on 22 showed suspicious mass in the left upper outer quadrant of the left breast (BIRADS5).  22 - Left breast biopsy 2:00 0 invasive moderately differentiated lobular carcinoma (9mm ER 95%, OR 95%, Her2 negative).  Pre-operative MRI 22 Known malignancy left breast.  If breast conservation therapy is elected recommend consideration of biopsy of additional site eg that in the inferior central breast with expectant management of focus upper outer quadrant (6 month MRI follow-up if benign results are obtained) No MRI evidence of malignancy right breast. BIRADS 4A  MRI guided biopsy 2023 of the left inferior breast revealing DCIS ER-95%, OR-95%. Known invasive lobular carcinoma left breast. If will change surgical management recommend consideration of biopsy of additional prominent focus noted upper slightly outer left breast.  23 - underwent LEFT Breast, Upper slightly outer MRI guided Biopsy: Pathology - Breast tissue with focal atypical duct hyperplasia (multiple additional deeper levels were examined). Dense stromal fibrosis. Fibrocystic changes with calcifications.   Surgery 3/13/23 - underwent 3 site LEFT Breast Lumpectomies and LEFT Axillary [place] Node Biopsy (Dr. Barajas - surgeon)  Pathology : - # 1. LEFT Breast, Lower Inner Quadrant Lumpectomy: DCIS, high nuclear grade, solid with comedonecrosis. Focal atypical ductal hyperplasia. Biopsy site change.  - # 2. LEFT Breast, Upper outer Quadrant Lumpectomy: Invasive Ductal Carcinoma (4.0 mm), moderately differentiated. Biopsy site change. Nonproliferative fibrocystic change.  - # 3. LEFT Breast, Central Lumpectomy: Invasive Lobular Carcinoma (14.0 mm), classic type. LCIS. Biopsy site change.  Oncotype DX Recurrence Score: 6  - LEFT Axillary [place] Node Biopsy: No tumor present in 2 Lymph nodes (0/2).   Risk assessment : menarche at 14, , 1st pregnancy at age 31, BRIE in  but unclear if ovaries removed; no OCPs, no HRT; offered genetic testing but she declined  Seen by Radiation Oncology (Dr. Hill) Plan for Radiation 16 fractions 6/2/23 until 23 - 4240 cGy  [de-identified] : ER 99%, PR99%, Her 2 negative (0 by IHC) [de-identified] : Oncotype RS 6 [de-identified] : 5/21/2024 Patient presents today to assess for evidence of breast cancer recurrence and assess treatment toxicity. Started anastrozole 6/23/2023 without incident reports intermittent night sweats -not interfering with QOL c/o headaches which she attributes to her high blood pressure or allergies; no dizziness, no blurry vision, no nausea, no vomiting Patient denies any breast masses, breast tenderness, skin changes or nipple discharge. Patient denies any SOB, CP, abdominal pain, bone pain, headache, or unexplained weight loss  Imaging Bone Density: 7/7/2023 mild osteopenia T -1.3  f/up 2 years Mammo/Sono 1/2024 - Birads 2.0 - reviewed report: seroma @ 7:00 and retroareolar

## 2024-05-22 NOTE — ASSESSMENT
[FreeTextEntry1] : 66 yo woman with multifocal left breast cancer Stage 1A ([id number]), ER+AZ+ her2 negative with oncotype 6 s/p multiple site lumpectomies. no role for chemotherapy given <1% absolute benefit from chemotherapy; - completed radiation therapy as recommended by Dr. Kristen Hill 6/2023  - continue anastrozole 1 mg daily ; plan 7 years of therapy (7/2023 tuntil 7/2030) - ROSA x 1 year  - to continue mammo/sono annually as per Dr. Barajas - 1/2025 -  - encouraged annual follow-up with Dr. Barajas - baseline labs from 8/2023 reviewed; will repeat in Spring 2024 ---> patient has bloodwork scheduled with PMD tomorrow  Headache - f/up with neurology - Mzia takes her amlodipine when she has a headache b/c she assumes it is due to high blood pressure - advised patient to discuss her amlodipine dosing and HTN management with her PMD; she was advised not to take additional medication without physician direction. - no other worrisome symptoms  - okay to take ibuprofen;  recommend magnesium 300 mg daily  Hot flashes - not interfering w. QOL - patient to notify us if she is interested in any meds to reduce night sweats  Osteopenia - BMD in 7/2023 with osteopenia mild T -1.3 - Vitamin D and weight bearing exercises recommended. - repeat BMD in  7/2025   - Patient had the opportunity to have all their questions answered to their satisfaction - f/u in 6 months

## 2024-05-22 NOTE — REVIEW OF SYSTEMS
[Diarrhea: Grade 0] : Diarrhea: Grade 0 [Anxiety] : anxiety [Negative] : Allergic/Immunologic [FreeTextEntry9] : left arm pain with movement/reduced range of motion

## 2024-05-22 NOTE — PHYSICAL EXAM
[Fully active, able to carry on all pre-disease performance without restriction] : Status 0 - Fully active, able to carry on all pre-disease performance without restriction [Normal] : affect appropriate [de-identified] : s/p left lumpectomy with roosevelt-arealoar and axillary LN dissection surgical scars well healed; hyperpigmentation of left areola secondary to RT;  no palpable lesions in either breast. no skin dimpling or nipple retraction

## 2024-08-30 ENCOUNTER — APPOINTMENT (OUTPATIENT)
Dept: SURGICAL ONCOLOGY | Facility: CLINIC | Age: 69
End: 2024-08-30
Payer: MEDICARE

## 2024-08-30 ENCOUNTER — LABORATORY RESULT (OUTPATIENT)
Age: 69
End: 2024-08-30

## 2024-08-30 VITALS
SYSTOLIC BLOOD PRESSURE: 124 MMHG | RESPIRATION RATE: 16 BRPM | BODY MASS INDEX: 31.99 KG/M2 | WEIGHT: 192 LBS | DIASTOLIC BLOOD PRESSURE: 73 MMHG | HEART RATE: 71 BPM | OXYGEN SATURATION: 97 % | HEIGHT: 65 IN

## 2024-08-30 DIAGNOSIS — C50.912 MALIGNANT NEOPLASM OF UNSPECIFIED SITE OF LEFT FEMALE BREAST: ICD-10-CM

## 2024-08-30 PROCEDURE — 10005 FNA BX W/US GDN 1ST LES: CPT

## 2024-08-30 PROCEDURE — 99214 OFFICE O/P EST MOD 30 MIN: CPT | Mod: 25

## 2024-08-30 RX ORDER — SULFAMETHOXAZOLE AND TRIMETHOPRIM 800; 160 MG/1; MG/1
800-160 TABLET ORAL TWICE DAILY
Qty: 20 | Refills: 0 | Status: ACTIVE | COMMUNITY
Start: 2024-08-30 | End: 1900-01-01

## 2024-08-30 NOTE — PROCEDURE
[FreeTextEntry1] : US guided FNA left breast seroma cavity 6-7:00 performed under sterile conditions using 1% lidocaine with epinephrine 6 cc straw-colored fluid aspirated, sent for culture and cytology Seroma not aspirated to completion Patient tolerated procedure well Sterile dressing applied

## 2024-08-30 NOTE — HISTORY OF PRESENT ILLNESS
[de-identified] : Patient is a 66 y/o female who presents a follow up for left breast cancers and left breast ADH. She is status post 3 site left breast lumpectomies and left axillary sentinel node biopsy on 3/13/23. She completed radiation treatment on 6/23/23.  On Anastrozole w/ Dr. Bartlett  B/L mammo/sono 1/2024 - BIRADS 2  Final pathology (3/13/23): 1.  Breast, left lower inner quadrant, lumpectomy: -   Ductal carcinoma in situ, high nuclear grade, solid with comedonecrosis -   Focal atypical ductal hyperplasia -   Biopsy site change 2.  Breast, left lower inner quadrant superior margin, margin resection:-Unremarkable breast tissue 3.  Breast, left lower inner quadrant medial margin, margin resection : -Unremarkable breast tissue 4.  Breast, left lower inner quadrant inferior margin, margin resection:-Unremarkable fibroadipose tissue 5.  Breast, left lower inner quadrant lateral margin, margin resection: -Unremarkable breast tissue 6.  Breast, left lower inner quadrant deep margin, margin resection: -Unremarkable skeletal muscle 7.  Breast, left lower inner quadrant anterior margin, margin resection:-Unremarkable fibroadipose tissue 8.  Breast, left upper outer quadrant, lumpectomy- Invasive ductal carcinoma, moderately differentiated, (4.0 mm) -   Biopsy site change -   Nonproliferative fibrocystic change 9.  Lymph nodes, left axillary sentinel, sentinel node biopsies:-No tumor present in 2 lymph nodes (0/2) 10.  Breast, left central, lumpectomy:- Invasive lobular carcinoma, classic type (14.0 mm) -   Lobular carcinoma in situ -   Biopsy site change 11.  Breast, left central superior margin, margin resection:-Proliferative fibrocystic change 12.  Breast, left central medial margin, margin resection:-Nonproliferative fibrocystic change 13.  Breast, left central inferior margin, margin resection:-Nonproliferative fibrocystic change 14.  Breast, left central lateral margin, margin resection:-Unremarkable breast tissue 15.  Breast, left central deep margin, margin resection:-Nonproliferative and focal proliferative fibrocystic change 16.  Breast, left central anterior margin, margin resection: -Nonproliferative fibrocystic change   MRI guided core biopsy 1/12/23: Left inferior central breast: DCIS, solid and cribriform pattern with low grade nuclear atypia. ER/MI+  Left upper slightly outer breast: breast tissue with focal ADH   Core Biopsy (11/23/22): Left breast 2:00, core biopsy: -Invasive lobular carcinoma (7 mm in greatest microscopic dimension) -ER+/MI+, Her-2 negative  Patient underwent screening mammogram/sonogram on 10/20/22 which showed left breast spiculated mass suspicious for malignancy. No sonographic correlate identified on screening. Also left breast asymmetry. Further unilateral left breast mammogram and ultrasound was performed on 11/9/22 which revealed a left upper outer quadrant, mid third depth 1.1 cm spiculated mass with associated architectural distortion for which a biopsy is recommended. (BI-RADS 5)  Breast MRI 12/30/2022: Known malignancy left breast. As above, wide excision is recommended. If breast conservation is elected, recommend consideration of biopsy of additional site at inferior central breast with expectant management of focus upper outer quadrant (6 month MRI follow up if benign results obtained). No MRI evidence of malignancy right breast. BIRADS-4A  No significant past medical history. She is on Atorvastatin for cholesterol.  Paternal grandmother had breast cancer.

## 2024-08-30 NOTE — ASSESSMENT
[FreeTextEntry1] : T1cN0 left breast invasive lobular carcinoma, ER/MT+, Her-2 negative Left inferior central breast DCIS, ER/MT+ Left upper outer breast ADH- T1aN0 invasive cancer  Suspect mild left mastitis Follow-up culture results and cytology from today's FNA Will start a course of oral antibiotics RTO 2 weeks

## 2024-08-30 NOTE — PHYSICAL EXAM
[Normal] : supple, no neck mass and thyroid not enlarged [Normal Neck Lymph Nodes] : normal neck lymph nodes  [Normal Supraclavicular Lymph Nodes] : normal supraclavicular lymph nodes [Normal Groin Lymph Nodes] : normal groin lymph nodes [Normal Axillary Lymph Nodes] : normal axillary lymph nodes [Normal] : oriented to person, place and time, with appropriate affect [de-identified] : post radiation changes left breast.  Mild erythema left lower breast, ultrasound shows persistent postop seroma.

## 2024-09-05 LAB — BACTERIA WND CULT: NORMAL

## 2024-09-13 ENCOUNTER — APPOINTMENT (OUTPATIENT)
Dept: SURGICAL ONCOLOGY | Facility: CLINIC | Age: 69
End: 2024-09-13
Payer: MEDICARE

## 2024-09-13 VITALS
BODY MASS INDEX: 31.99 KG/M2 | WEIGHT: 192 LBS | HEART RATE: 63 BPM | OXYGEN SATURATION: 98 % | HEIGHT: 65 IN | SYSTOLIC BLOOD PRESSURE: 116 MMHG | DIASTOLIC BLOOD PRESSURE: 70 MMHG

## 2024-09-13 DIAGNOSIS — C50.912 MALIGNANT NEOPLASM OF UNSPECIFIED SITE OF LEFT FEMALE BREAST: ICD-10-CM

## 2024-09-13 PROCEDURE — 99215 OFFICE O/P EST HI 40 MIN: CPT

## 2024-09-13 NOTE — PHYSICAL EXAM
[Normal] : supple, no neck mass and thyroid not enlarged [Normal Neck Lymph Nodes] : normal neck lymph nodes  [Normal Supraclavicular Lymph Nodes] : normal supraclavicular lymph nodes [Normal Groin Lymph Nodes] : normal groin lymph nodes [Normal Axillary Lymph Nodes] : normal axillary lymph nodes [Normal] : oriented to person, place and time, with appropriate affect [de-identified] : post radiation changes left breast.  resolving erythema left lower breast.

## 2024-09-13 NOTE — ASSESSMENT
[FreeTextEntry1] : T1cN0 left breast invasive lobular carcinoma, ER/KY+, Her-2 negative Left inferior central breast DCIS, ER/KY+ Left upper outer breast ADH- T1aN0 invasive cancer  Left mastitis resolving - negative cx and cytology from FNA Completed oral antibiotics Rec Advil/Motrin prn for left breast musculoskeletal pain Cont yearly breast imaging 1/2025 RTO 4 months

## 2024-09-13 NOTE — HISTORY OF PRESENT ILLNESS
[de-identified] : Patient is a 67 y/o female who presents a follow up for left breast cancers and left breast ADH. She is status post 3 site left breast lumpectomies and left axillary sentinel node biopsy on 3/13/23. She completed radiation treatment on 6/23/23.  On Anastrozole w/ Dr. Bartlett.  Pt was started on oral abx for left breast mastitis on 8/30/24. Cx did not grow anything. Pt is feeling better.  FNA 8/30/24- BREAST, LEFT, 6 - 7 OCLOCK, FNA NEGATIVE FOR MALIGNANT CELLS. Compatible with cyst contents  B/L mammo/sono 1/2024 - BIRADS 2  Final pathology (3/13/23): 1.  Breast, left lower inner quadrant, lumpectomy: -   Ductal carcinoma in situ, high nuclear grade, solid with comedonecrosis -   Focal atypical ductal hyperplasia -   Biopsy site change 2.  Breast, left lower inner quadrant superior margin, margin resection:-Unremarkable breast tissue 3.  Breast, left lower inner quadrant medial margin, margin resection : -Unremarkable breast tissue 4.  Breast, left lower inner quadrant inferior margin, margin resection:-Unremarkable fibroadipose tissue 5.  Breast, left lower inner quadrant lateral margin, margin resection: -Unremarkable breast tissue 6.  Breast, left lower inner quadrant deep margin, margin resection: -Unremarkable skeletal muscle 7.  Breast, left lower inner quadrant anterior margin, margin resection:-Unremarkable fibroadipose tissue 8.  Breast, left upper outer quadrant, lumpectomy- Invasive ductal carcinoma, moderately differentiated, (4.0 mm) -   Biopsy site change -   Nonproliferative fibrocystic change 9.  Lymph nodes, left axillary sentinel, sentinel node biopsies:-No tumor present in 2 lymph nodes (0/2) 10.  Breast, left central, lumpectomy:- Invasive lobular carcinoma, classic type (14.0 mm) -   Lobular carcinoma in situ -   Biopsy site change 11.  Breast, left central superior margin, margin resection:-Proliferative fibrocystic change 12.  Breast, left central medial margin, margin resection:-Nonproliferative fibrocystic change 13.  Breast, left central inferior margin, margin resection:-Nonproliferative fibrocystic change 14.  Breast, left central lateral margin, margin resection:-Unremarkable breast tissue 15.  Breast, left central deep margin, margin resection:-Nonproliferative and focal proliferative fibrocystic change 16.  Breast, left central anterior margin, margin resection: -Nonproliferative fibrocystic change   MRI guided core biopsy 1/12/23: Left inferior central breast: DCIS, solid and cribriform pattern with low grade nuclear atypia. ER/MS+  Left upper slightly outer breast: breast tissue with focal ADH   Core Biopsy (11/23/22): Left breast 2:00, core biopsy: -Invasive lobular carcinoma (7 mm in greatest microscopic dimension) -ER+/MS+, Her-2 negative  Patient underwent screening mammogram/sonogram on 10/20/22 which showed left breast spiculated mass suspicious for malignancy. No sonographic correlate identified on screening. Also left breast asymmetry. Further unilateral left breast mammogram and ultrasound was performed on 11/9/22 which revealed a left upper outer quadrant, mid third depth 1.1 cm spiculated mass with associated architectural distortion for which a biopsy is recommended. (BI-RADS 5)  Breast MRI 12/30/2022: Known malignancy left breast. As above, wide excision is recommended. If breast conservation is elected, recommend consideration of biopsy of additional site at inferior central breast with expectant management of focus upper outer quadrant (6 month MRI follow up if benign results obtained). No MRI evidence of malignancy right breast. BIRADS-4A  No significant past medical history. She is on Atorvastatin for cholesterol.  Paternal grandmother had breast cancer.

## 2024-09-13 NOTE — ADDENDUM
[FreeTextEntry1] : I, Nevin Ricardo, acted solely as a scribe for Dr. Monty Barajas on this date 09/13/2024.

## 2024-09-13 NOTE — PHYSICAL EXAM
[Normal] : supple, no neck mass and thyroid not enlarged [Normal Neck Lymph Nodes] : normal neck lymph nodes  [Normal Supraclavicular Lymph Nodes] : normal supraclavicular lymph nodes [Normal Groin Lymph Nodes] : normal groin lymph nodes [Normal Axillary Lymph Nodes] : normal axillary lymph nodes [Normal] : oriented to person, place and time, with appropriate affect [de-identified] : post radiation changes left breast.  resolving erythema left lower breast.

## 2024-09-13 NOTE — HISTORY OF PRESENT ILLNESS
[de-identified] : Patient is a 69 y/o female who presents a follow up for left breast cancers and left breast ADH. She is status post 3 site left breast lumpectomies and left axillary sentinel node biopsy on 3/13/23. She completed radiation treatment on 6/23/23.  On Anastrozole w/ Dr. Bartlett.  Pt was started on oral abx for left breast mastitis on 8/30/24. Cx did not grow anything. Pt is feeling better.  FNA 8/30/24- BREAST, LEFT, 6 - 7 OCLOCK, FNA NEGATIVE FOR MALIGNANT CELLS. Compatible with cyst contents  B/L mammo/sono 1/2024 - BIRADS 2  Final pathology (3/13/23): 1.  Breast, left lower inner quadrant, lumpectomy: -   Ductal carcinoma in situ, high nuclear grade, solid with comedonecrosis -   Focal atypical ductal hyperplasia -   Biopsy site change 2.  Breast, left lower inner quadrant superior margin, margin resection:-Unremarkable breast tissue 3.  Breast, left lower inner quadrant medial margin, margin resection : -Unremarkable breast tissue 4.  Breast, left lower inner quadrant inferior margin, margin resection:-Unremarkable fibroadipose tissue 5.  Breast, left lower inner quadrant lateral margin, margin resection: -Unremarkable breast tissue 6.  Breast, left lower inner quadrant deep margin, margin resection: -Unremarkable skeletal muscle 7.  Breast, left lower inner quadrant anterior margin, margin resection:-Unremarkable fibroadipose tissue 8.  Breast, left upper outer quadrant, lumpectomy- Invasive ductal carcinoma, moderately differentiated, (4.0 mm) -   Biopsy site change -   Nonproliferative fibrocystic change 9.  Lymph nodes, left axillary sentinel, sentinel node biopsies:-No tumor present in 2 lymph nodes (0/2) 10.  Breast, left central, lumpectomy:- Invasive lobular carcinoma, classic type (14.0 mm) -   Lobular carcinoma in situ -   Biopsy site change 11.  Breast, left central superior margin, margin resection:-Proliferative fibrocystic change 12.  Breast, left central medial margin, margin resection:-Nonproliferative fibrocystic change 13.  Breast, left central inferior margin, margin resection:-Nonproliferative fibrocystic change 14.  Breast, left central lateral margin, margin resection:-Unremarkable breast tissue 15.  Breast, left central deep margin, margin resection:-Nonproliferative and focal proliferative fibrocystic change 16.  Breast, left central anterior margin, margin resection: -Nonproliferative fibrocystic change   MRI guided core biopsy 1/12/23: Left inferior central breast: DCIS, solid and cribriform pattern with low grade nuclear atypia. ER/IL+  Left upper slightly outer breast: breast tissue with focal ADH   Core Biopsy (11/23/22): Left breast 2:00, core biopsy: -Invasive lobular carcinoma (7 mm in greatest microscopic dimension) -ER+/IL+, Her-2 negative  Patient underwent screening mammogram/sonogram on 10/20/22 which showed left breast spiculated mass suspicious for malignancy. No sonographic correlate identified on screening. Also left breast asymmetry. Further unilateral left breast mammogram and ultrasound was performed on 11/9/22 which revealed a left upper outer quadrant, mid third depth 1.1 cm spiculated mass with associated architectural distortion for which a biopsy is recommended. (BI-RADS 5)  Breast MRI 12/30/2022: Known malignancy left breast. As above, wide excision is recommended. If breast conservation is elected, recommend consideration of biopsy of additional site at inferior central breast with expectant management of focus upper outer quadrant (6 month MRI follow up if benign results obtained). No MRI evidence of malignancy right breast. BIRADS-4A  No significant past medical history. She is on Atorvastatin for cholesterol.  Paternal grandmother had breast cancer.

## 2024-09-13 NOTE — ASSESSMENT
[FreeTextEntry1] : T1cN0 left breast invasive lobular carcinoma, ER/VT+, Her-2 negative Left inferior central breast DCIS, ER/VT+ Left upper outer breast ADH- T1aN0 invasive cancer  Left mastitis resolving - negative cx and cytology from [facility] Completed oral antibiotics Rec Advil/Motrin prn for left breast musculoskeletal pain Cont yearly breast imaging 1/2025 RTO 4 months

## 2024-09-30 ENCOUNTER — RX RENEWAL (OUTPATIENT)
Age: 69
End: 2024-09-30

## 2024-11-19 ENCOUNTER — APPOINTMENT (OUTPATIENT)
Dept: HEMATOLOGY ONCOLOGY | Facility: CLINIC | Age: 69
End: 2024-11-19

## 2025-01-14 ENCOUNTER — OUTPATIENT (OUTPATIENT)
Dept: OUTPATIENT SERVICES | Facility: HOSPITAL | Age: 70
LOS: 1 days | End: 2025-01-14
Payer: MEDICARE

## 2025-01-14 ENCOUNTER — APPOINTMENT (OUTPATIENT)
Dept: ULTRASOUND IMAGING | Facility: IMAGING CENTER | Age: 70
End: 2025-01-14
Payer: MEDICARE

## 2025-01-14 ENCOUNTER — RESULT REVIEW (OUTPATIENT)
Age: 70
End: 2025-01-14

## 2025-01-14 ENCOUNTER — APPOINTMENT (OUTPATIENT)
Dept: MAMMOGRAPHY | Facility: IMAGING CENTER | Age: 70
End: 2025-01-14
Payer: MEDICARE

## 2025-01-14 DIAGNOSIS — Z00.8 ENCOUNTER FOR OTHER GENERAL EXAMINATION: ICD-10-CM

## 2025-01-14 DIAGNOSIS — Z90.89 ACQUIRED ABSENCE OF OTHER ORGANS: Chronic | ICD-10-CM

## 2025-01-14 DIAGNOSIS — Z90.710 ACQUIRED ABSENCE OF BOTH CERVIX AND UTERUS: Chronic | ICD-10-CM

## 2025-01-14 DIAGNOSIS — Z98.890 OTHER SPECIFIED POSTPROCEDURAL STATES: Chronic | ICD-10-CM

## 2025-01-14 PROCEDURE — 77066 DX MAMMO INCL CAD BI: CPT | Mod: 26

## 2025-01-14 PROCEDURE — G0279: CPT | Mod: 26

## 2025-01-14 PROCEDURE — 77066 DX MAMMO INCL CAD BI: CPT

## 2025-01-14 PROCEDURE — 76641 ULTRASOUND BREAST COMPLETE: CPT | Mod: 26,50

## 2025-01-14 PROCEDURE — G0279: CPT

## 2025-01-14 PROCEDURE — 76641 ULTRASOUND BREAST COMPLETE: CPT

## 2025-01-22 ENCOUNTER — APPOINTMENT (OUTPATIENT)
Dept: SURGICAL ONCOLOGY | Facility: CLINIC | Age: 70
End: 2025-01-22
Payer: MEDICARE

## 2025-01-22 ENCOUNTER — NON-APPOINTMENT (OUTPATIENT)
Age: 70
End: 2025-01-22

## 2025-01-22 VITALS
SYSTOLIC BLOOD PRESSURE: 146 MMHG | HEART RATE: 85 BPM | HEIGHT: 67 IN | DIASTOLIC BLOOD PRESSURE: 80 MMHG | RESPIRATION RATE: 17 BRPM | BODY MASS INDEX: 29.35 KG/M2 | WEIGHT: 187 LBS | OXYGEN SATURATION: 99 %

## 2025-01-22 DIAGNOSIS — D05.12 INTRADUCTAL CARCINOMA IN SITU OF LEFT BREAST: ICD-10-CM

## 2025-01-22 DIAGNOSIS — C50.912 MALIGNANT NEOPLASM OF UNSPECIFIED SITE OF LEFT FEMALE BREAST: ICD-10-CM

## 2025-01-22 PROCEDURE — 99215 OFFICE O/P EST HI 40 MIN: CPT

## 2025-01-24 ENCOUNTER — RX RENEWAL (OUTPATIENT)
Age: 70
End: 2025-01-24

## 2025-03-26 ENCOUNTER — OUTPATIENT (OUTPATIENT)
Dept: OUTPATIENT SERVICES | Facility: HOSPITAL | Age: 70
LOS: 1 days | Discharge: HOME | End: 2025-03-26

## 2025-03-26 DIAGNOSIS — Z98.890 OTHER SPECIFIED POSTPROCEDURAL STATES: Chronic | ICD-10-CM

## 2025-03-26 DIAGNOSIS — D05.10 INTRADUCTAL CARCINOMA IN SITU OF UNSPECIFIED BREAST: ICD-10-CM

## 2025-03-26 DIAGNOSIS — Z90.89 ACQUIRED ABSENCE OF OTHER ORGANS: Chronic | ICD-10-CM

## 2025-03-26 DIAGNOSIS — Z90.710 ACQUIRED ABSENCE OF BOTH CERVIX AND UTERUS: Chronic | ICD-10-CM

## 2025-03-27 ENCOUNTER — RESULT REVIEW (OUTPATIENT)
Age: 70
End: 2025-03-27

## 2025-03-27 ENCOUNTER — APPOINTMENT (OUTPATIENT)
Dept: HEMATOLOGY ONCOLOGY | Facility: CLINIC | Age: 70
End: 2025-03-27
Payer: MEDICARE

## 2025-03-27 VITALS
TEMPERATURE: 97 F | WEIGHT: 182.98 LBS | HEART RATE: 62 BPM | SYSTOLIC BLOOD PRESSURE: 119 MMHG | RESPIRATION RATE: 16 BRPM | DIASTOLIC BLOOD PRESSURE: 76 MMHG | BODY MASS INDEX: 28.66 KG/M2 | OXYGEN SATURATION: 99 %

## 2025-03-27 DIAGNOSIS — R21 RASH AND OTHER NONSPECIFIC SKIN ERUPTION: ICD-10-CM

## 2025-03-27 DIAGNOSIS — C50.912 MALIGNANT NEOPLASM OF UNSPECIFIED SITE OF LEFT FEMALE BREAST: ICD-10-CM

## 2025-03-27 LAB
ALBUMIN SERPL ELPH-MCNC: 4.7 G/DL
ALP BLD-CCNC: 87 U/L
ALT SERPL-CCNC: 22 U/L
ANION GAP SERPL CALC-SCNC: 9 MMOL/L
AST SERPL-CCNC: 24 U/L
BASOPHILS # BLD AUTO: 0.06 K/UL — SIGNIFICANT CHANGE UP (ref 0–0.2)
BASOPHILS NFR BLD AUTO: 1 % — SIGNIFICANT CHANGE UP (ref 0–2)
BILIRUB SERPL-MCNC: 0.2 MG/DL
BUN SERPL-MCNC: 10 MG/DL
CALCIUM SERPL-MCNC: 10.3 MG/DL
CHLORIDE SERPL-SCNC: 101 MMOL/L
CO2 SERPL-SCNC: 29 MMOL/L
CREAT SERPL-MCNC: 0.63 MG/DL
EGFRCR SERPLBLD CKD-EPI 2021: 97 ML/MIN/1.73M2
EOSINOPHIL # BLD AUTO: 0.28 K/UL — SIGNIFICANT CHANGE UP (ref 0–0.5)
EOSINOPHIL NFR BLD AUTO: 4.6 % — SIGNIFICANT CHANGE UP (ref 0–6)
GLUCOSE SERPL-MCNC: 104 MG/DL
HCT VFR BLD CALC: 36.3 % — SIGNIFICANT CHANGE UP (ref 34.5–45)
HGB BLD-MCNC: 12 G/DL — SIGNIFICANT CHANGE UP (ref 11.5–15.5)
IMM GRANULOCYTES NFR BLD AUTO: 0.3 % — SIGNIFICANT CHANGE UP (ref 0–0.9)
LYMPHOCYTES # BLD AUTO: 1.89 K/UL — SIGNIFICANT CHANGE UP (ref 1–3.3)
LYMPHOCYTES # BLD AUTO: 31.1 % — SIGNIFICANT CHANGE UP (ref 13–44)
MCHC RBC-ENTMCNC: 26.9 PG — LOW (ref 27–34)
MCHC RBC-ENTMCNC: 33.1 G/DL — SIGNIFICANT CHANGE UP (ref 32–36)
MCV RBC AUTO: 81.4 FL — SIGNIFICANT CHANGE UP (ref 80–100)
MONOCYTES # BLD AUTO: 0.49 K/UL — SIGNIFICANT CHANGE UP (ref 0–0.9)
MONOCYTES NFR BLD AUTO: 8.1 % — SIGNIFICANT CHANGE UP (ref 2–14)
NEUTROPHILS # BLD AUTO: 3.34 K/UL — SIGNIFICANT CHANGE UP (ref 1.8–7.4)
NEUTROPHILS NFR BLD AUTO: 54.9 % — SIGNIFICANT CHANGE UP (ref 43–77)
NRBC BLD AUTO-RTO: 0 /100 WBCS — SIGNIFICANT CHANGE UP (ref 0–0)
PLATELET # BLD AUTO: 259 K/UL — SIGNIFICANT CHANGE UP (ref 150–400)
POTASSIUM SERPL-SCNC: 4.7 MMOL/L
PROT SERPL-MCNC: 7.6 G/DL
RBC # BLD: 4.46 M/UL — SIGNIFICANT CHANGE UP (ref 3.8–5.2)
RBC # FLD: 14.2 % — SIGNIFICANT CHANGE UP (ref 10.3–14.5)
SODIUM SERPL-SCNC: 139 MMOL/L
WBC # BLD: 6.08 K/UL — SIGNIFICANT CHANGE UP (ref 3.8–10.5)
WBC # FLD AUTO: 6.08 K/UL — SIGNIFICANT CHANGE UP (ref 3.8–10.5)

## 2025-03-27 PROCEDURE — 99214 OFFICE O/P EST MOD 30 MIN: CPT

## 2025-03-27 PROCEDURE — G2211 COMPLEX E/M VISIT ADD ON: CPT

## 2025-03-27 RX ORDER — TRIAMCINOLONE ACETONIDE 1 MG/G
0.1 OINTMENT TOPICAL
Qty: 1 | Refills: 1 | Status: ACTIVE | COMMUNITY
Start: 2025-03-27 | End: 1900-01-01

## 2025-04-14 NOTE — DISCUSSION/SUMMARY
[de-identified] : MRI right knee to eval for MMT\par Naproxen rx\par PT rx \par \par Progress note completed by Ju Souza PA-C. \par \par The patient was advised of the diagnosis.  The natural history of the pathology was explained in full to the patient in layman's terms. All questions were answered.  The risks and benefits of surgical and non-surgical treatment alternatives were explained in full to the patient.\par \par ------------------------------------------------------------------------------------------------------------------------------------------------------\par \par The patient was advised that an advanced diagnostic/imaging study (MRI/CT/etc) will be ordered to evaluate potential pathology in the affected area(s).  The patient was further advised to follow up in the office to review the results of the study and determine further management that may be indicated.\par \par 
Quality 431: Preventive Care And Screening: Unhealthy Alcohol Use - Screening: Patient not identified as an unhealthy alcohol user when screened for unhealthy alcohol use using a systematic screening method
Quality 130: Documentation Of Current Medications In The Medical Record: Current Medications Documented
Detail Level: Detailed
Quality 226: Preventive Care And Screening: Tobacco Use: Screening And Cessation Intervention: Patient screened for tobacco use and is an ex/non-smoker

## 2025-06-18 RX ORDER — EXEMESTANE 25 MG/1
25 TABLET, FILM COATED ORAL DAILY
Qty: 30 | Refills: 1 | Status: ACTIVE | COMMUNITY
Start: 2025-06-18 | End: 1900-01-01

## 2025-07-01 ENCOUNTER — OUTPATIENT (OUTPATIENT)
Dept: OUTPATIENT SERVICES | Facility: HOSPITAL | Age: 70
LOS: 1 days | End: 2025-07-01
Payer: MEDICARE

## 2025-07-01 ENCOUNTER — APPOINTMENT (OUTPATIENT)
Dept: RADIOLOGY | Facility: IMAGING CENTER | Age: 70
End: 2025-07-01
Payer: MEDICARE

## 2025-07-01 DIAGNOSIS — Z98.890 OTHER SPECIFIED POSTPROCEDURAL STATES: Chronic | ICD-10-CM

## 2025-07-01 DIAGNOSIS — Z90.89 ACQUIRED ABSENCE OF OTHER ORGANS: Chronic | ICD-10-CM

## 2025-07-01 DIAGNOSIS — Z90.710 ACQUIRED ABSENCE OF BOTH CERVIX AND UTERUS: Chronic | ICD-10-CM

## 2025-07-01 DIAGNOSIS — M85.80 OTHER SPECIFIED DISORDERS OF BONE DENSITY AND STRUCTURE, UNSPECIFIED SITE: ICD-10-CM

## 2025-07-01 PROCEDURE — 77080 DXA BONE DENSITY AXIAL: CPT

## 2025-07-01 PROCEDURE — 77080 DXA BONE DENSITY AXIAL: CPT | Mod: 26

## 2025-07-16 ENCOUNTER — RX RENEWAL (OUTPATIENT)
Age: 70
End: 2025-07-16

## 2025-08-13 ENCOUNTER — APPOINTMENT (OUTPATIENT)
Dept: SURGICAL ONCOLOGY | Facility: CLINIC | Age: 70
End: 2025-08-13
Payer: MEDICARE

## 2025-08-13 VITALS
HEIGHT: 67 IN | WEIGHT: 182 LBS | OXYGEN SATURATION: 97 % | BODY MASS INDEX: 28.56 KG/M2 | SYSTOLIC BLOOD PRESSURE: 120 MMHG | HEART RATE: 59 BPM | DIASTOLIC BLOOD PRESSURE: 70 MMHG

## 2025-08-13 DIAGNOSIS — C50.912 MALIGNANT NEOPLASM OF UNSPECIFIED SITE OF LEFT FEMALE BREAST: ICD-10-CM

## 2025-08-13 PROCEDURE — 99215 OFFICE O/P EST HI 40 MIN: CPT

## 2025-09-15 ENCOUNTER — RX RENEWAL (OUTPATIENT)
Age: 70
End: 2025-09-15

## (undated) DEVICE — SUT SILK 2-0 18" FS

## (undated) DEVICE — SUT PLAIN GUT FAST ABSORBING 5-0 PC-1

## (undated) DEVICE — RADIOGRAPHY DVC SPEC TRANSPEC

## (undated) DEVICE — SUT MONOCRYL 4-0 27" PS-2 UNDYED

## (undated) DEVICE — SUCTION YANKAUER NO CONTROL VENT

## (undated) DEVICE — WARMING BLANKET LOWER ADULT

## (undated) DEVICE — DRSG MAMMARY SUPPORT MED SIZE 2

## (undated) DEVICE — DRSG MAMMARY SUPPORT XL SIZE 5

## (undated) DEVICE — SAVI SCOUT HANDPIECE

## (undated) DEVICE — SOL IRR POUR H2O 500ML

## (undated) DEVICE — SUT MONOCRYL 3-0 18" PS-2 UNDYED

## (undated) DEVICE — VENODYNE/SCD SLEEVE CALF MEDIUM

## (undated) DEVICE — DRSG MAMMARY SUPPORT MED SIZE 3

## (undated) DEVICE — DRAPE CHEST BREAST 106" X 122"

## (undated) DEVICE — ELCTR GROUNDING PAD ADULT COVIDIEN

## (undated) DEVICE — GLV 7 PROTEXIS (WHITE)

## (undated) DEVICE — DRSG STERISTRIPS 0.5 X 4"

## (undated) DEVICE — ELCTR ROCKER SWITCH PENCIL BLUE 10FT

## (undated) DEVICE — DRSG MAMMARY SUPPORT LG SIZE 4

## (undated) DEVICE — DRSG TEGADERM 4X4.75"

## (undated) DEVICE — POSITIONER FOAM EGG CRATE ULNAR 2PCS (PINK)

## (undated) DEVICE — DRSG MAMMARY SUPPORT SM SIZE 1

## (undated) DEVICE — GOWN LG

## (undated) DEVICE — DRAPE 3/4 SHEET 52X76"

## (undated) DEVICE — SOL IRR POUR NS 0.9% 500ML

## (undated) DEVICE — DRAPE INSTRUMENT POUCH 6.75" X 11"

## (undated) DEVICE — GLV 7.5 PROTEXIS (WHITE)

## (undated) DEVICE — ELCTR BOVIE TIP BLADE INSULATED 4" EDGE

## (undated) DEVICE — PACK MINOR NO DRAPE

## (undated) DEVICE — POSITIONER STRAP ARMBOARD VELCRO TS-30

## (undated) DEVICE — DRAPE TOWEL BLUE 17" X 24"

## (undated) DEVICE — LAP PAD W RING 18 X 18"

## (undated) DEVICE — NDL HYPO SAFE 25G X 1" (ORANGE)